# Patient Record
Sex: FEMALE | Race: WHITE | NOT HISPANIC OR LATINO | Employment: FULL TIME | URBAN - METROPOLITAN AREA
[De-identification: names, ages, dates, MRNs, and addresses within clinical notes are randomized per-mention and may not be internally consistent; named-entity substitution may affect disease eponyms.]

---

## 2019-10-02 ENCOUNTER — OFFICE VISIT (OUTPATIENT)
Dept: OBSTETRICS AND GYNECOLOGY | Facility: CLINIC | Age: 59
End: 2019-10-02
Payer: COMMERCIAL

## 2019-10-02 VITALS
DIASTOLIC BLOOD PRESSURE: 100 MMHG | HEIGHT: 68 IN | BODY MASS INDEX: 31.57 KG/M2 | SYSTOLIC BLOOD PRESSURE: 140 MMHG | WEIGHT: 208.31 LBS

## 2019-10-02 DIAGNOSIS — T83.711A: Primary | ICD-10-CM

## 2019-10-02 DIAGNOSIS — N89.8 VAGINAL DISCHARGE: ICD-10-CM

## 2019-10-02 DIAGNOSIS — N76.0 BACTERIAL VAGINOSIS: ICD-10-CM

## 2019-10-02 DIAGNOSIS — N95.2 VAGINAL ATROPHY: ICD-10-CM

## 2019-10-02 DIAGNOSIS — B96.89 BACTERIAL VAGINOSIS: ICD-10-CM

## 2019-10-02 PROCEDURE — 3008F BODY MASS INDEX DOCD: CPT | Mod: CPTII,S$GLB,, | Performed by: OBSTETRICS & GYNECOLOGY

## 2019-10-02 PROCEDURE — 99999 PR PBB SHADOW E&M-NEW PATIENT-LVL III: ICD-10-PCS | Mod: PBBFAC,,, | Performed by: OBSTETRICS & GYNECOLOGY

## 2019-10-02 PROCEDURE — 99204 OFFICE O/P NEW MOD 45 MIN: CPT | Mod: S$GLB,,, | Performed by: OBSTETRICS & GYNECOLOGY

## 2019-10-02 PROCEDURE — 3008F PR BODY MASS INDEX (BMI) DOCUMENTED: ICD-10-PCS | Mod: CPTII,S$GLB,, | Performed by: OBSTETRICS & GYNECOLOGY

## 2019-10-02 PROCEDURE — 99204 PR OFFICE/OUTPT VISIT, NEW, LEVL IV, 45-59 MIN: ICD-10-PCS | Mod: S$GLB,,, | Performed by: OBSTETRICS & GYNECOLOGY

## 2019-10-02 PROCEDURE — 99999 PR PBB SHADOW E&M-NEW PATIENT-LVL III: CPT | Mod: PBBFAC,,, | Performed by: OBSTETRICS & GYNECOLOGY

## 2019-10-02 RX ORDER — IBUPROFEN 200 MG
200 TABLET ORAL EVERY 6 HOURS PRN
Status: ON HOLD | COMMUNITY
End: 2020-03-02 | Stop reason: HOSPADM

## 2019-10-02 RX ORDER — ESTRADIOL 0.1 MG/G
1 CREAM VAGINAL
Qty: 42.5 G | Refills: 1 | Status: SHIPPED | OUTPATIENT
Start: 2019-10-02 | End: 2020-09-08

## 2019-10-02 RX ORDER — METRONIDAZOLE 500 MG/1
500 TABLET ORAL 2 TIMES DAILY
Qty: 14 TABLET | Refills: 0 | Status: SHIPPED | OUTPATIENT
Start: 2019-10-02 | End: 2019-10-09

## 2019-10-02 NOTE — Clinical Note
José Antonio Torres,I hope all is well for you!  This sweet lady has a pretty significant mesh erosion of what appears to be some sort of suburethral sling.  I started her on vaginal estrace for now, but she's gonna need surgery.  She's got complete exposure of the entire strip of mesh on her right side with another area of exposure more medially.  Can you guys get her scheduled to see one of you?  She's also still having urinary incontinence.  I don't think she's got a fistula, but I'm not 100%.

## 2019-10-02 NOTE — PROGRESS NOTES
Subjective:       Patient ID: Brianna Brand is a 59 y.o. female.    Chief Complaint:  Vaginal Discharge      History of Present Illness  HPI  Presents with a chronic bloody vaginal discharge with foul odor.  States this started after she had a hysterectomy/BSO and what sounds like a suburethral mesh sling for urinary incontinence.  She had urinary incontinence prior to the surgery, and it did not resolve at all post-op.  She now has spontaneous urinary leakage.  She also has vaginal pain.  Has a foul smelling bloody vaginal discharge.  She is sexually active with her , but sex is painful at the vaginal opening.  Patient used to smoke, but quit a few years ago.    GYN & OB History  No LMP recorded. Patient has had a hysterectomy.   Date of Last Pap: No result found    OB History    Para Term  AB Living   2 2 2     2   SAB TAB Ectopic Multiple Live Births           2      # Outcome Date GA Lbr Jim/2nd Weight Sex Delivery Anes PTL Lv   2 Term      CS-LTranv   KATIE   1 Term      CS-LTranv   KATIE       Review of Systems  Review of Systems   Constitutional: Negative for fatigue, fever and unexpected weight change.   Gastrointestinal: Negative for abdominal pain, constipation, diarrhea, nausea and vomiting.   Genitourinary: Positive for bladder incontinence, dyspareunia, vaginal bleeding, vaginal discharge, vaginal pain, urinary incontinence and vaginal odor. Negative for dysuria, frequency, urgency and postcoital bleeding.           Objective:    Physical Exam:   Constitutional: She is oriented to person, place, and time. She appears well-developed and well-nourished. No distress.             Abdominal: Soft. She exhibits no distension and no mass. There is no tenderness. There is no rebound and no guarding. Hernia confirmed negative in the right inguinal area and confirmed negative in the left inguinal area.     Genitourinary: Pelvic exam was performed with patient supine. There is no rash,  tenderness, lesion or injury on the right labia. There is no rash, tenderness, lesion or injury on the left labia. Uterus is absent. Right adnexum displays no mass, no tenderness and no fullness. Left adnexum displays no mass, no tenderness and no fullness. No erythema (mucosa pale and thin), tenderness, bleeding, rectocele, cystocele or unspecified prolapse of vaginal walls in the vagina. No foreign body in the vagina. There are signs of injury (complete exposure of suburethral mesh on the right side of the introitus at 11 o'clock with firm granulomatous vaginal tissue with at least 2x2cm vaginal mucosal defect.  Another area of mesh erosion more medially) in the vagina. Vaginal discharge (yellow with fishy odor) found. Cervix exhibits absence.               Neurological: She is alert and oriented to person, place, and time.     Psychiatric: She has a normal mood and affect.        wet prep: many clue cells  Assessment:        1. Erosion of vaginal wall due to surgical mesh, initial encounter    2. Vaginal discharge    3. Bacterial vaginosis    4. Vaginal atrophy                Plan:      Brianna was seen today for vaginal discharge.    Diagnoses and all orders for this visit:    Erosion of vaginal wall due to surgical mesh, initial encounter  -     estradiol (ESTRACE) 0.01 % (0.1 mg/gram) vaginal cream; Place 1 g vaginally 3 (three) times a week.  -     Ambulatory referral to Urogynecology    Vaginal discharge    Bacterial vaginosis  -     metroNIDAZOLE (FLAGYL) 500 MG tablet; Take 1 tablet (500 mg total) by mouth 2 (two) times daily. for 7 days    Vaginal atrophy  -     estradiol (ESTRACE) 0.01 % (0.1 mg/gram) vaginal cream; Place 1 g vaginally 3 (three) times a week.    Reviewed exam findings with the patient.  Given the extent of the erosion and size of the vaginal mucosal defect along with persistent urinary incontinence, will refer to urogynecology.

## 2019-10-07 ENCOUNTER — TELEPHONE (OUTPATIENT)
Dept: UROGYNECOLOGY | Facility: CLINIC | Age: 59
End: 2019-10-07

## 2019-10-07 NOTE — TELEPHONE ENCOUNTER
[facility],    Can you please schedule pt for consult with Dr. Perea for mesh erosion, referred by Dr. Laurel Candelaria.    Thanks,  Raad

## 2019-10-07 NOTE — TELEPHONE ENCOUNTER
----- Message from Brian Perea MD sent at 10/6/2019  1:22 PM CDT -----  Regarding: please get patient scheduled for appt  Please get patient scheduled to see me in near future.  Consult from Dr. Laurel Candelaria. Thanks!  ----- Message -----  From: Laurel Candelaria MD  Sent: 10/2/2019   9:10 AM CDT  To: MD José Antonio Cisneros,  I hope all is well for you!  This sweet lady has a pretty significant mesh erosion of what appears to be some sort of suburethral sling.  I started her on vaginal estrace for now, but she's gonna need surgery.  She's got complete exposure of the entire strip of mesh on her right side with another area of exposure more medially.  Can you guys get her scheduled to see one of you?  She's also still having urinary incontinence.  I don't think she's got a fistula, but I'm not 100%.

## 2019-10-08 ENCOUNTER — TELEPHONE (OUTPATIENT)
Dept: UROGYNECOLOGY | Facility: CLINIC | Age: 59
End: 2019-10-08

## 2019-10-08 NOTE — TELEPHONE ENCOUNTER
----- Message from Brian Perea MD sent at 10/7/2019  7:31 PM CDT -----  Can we get her in sooner than 1/9/2020?  If can't find spot, let me know, and we can make something work. Thanks!  ----- Message -----  From: Laurel Candelaria MD  Sent: 10/2/2019   9:10 AM CDT  To: MD José Antonio Cisnerso,  I hope all is well for you!  This sweet lady has a pretty significant mesh erosion of what appears to be some sort of suburethral sling.  I started her on vaginal estrace for now, but she's gonna need surgery.  She's got complete exposure of the entire strip of mesh on her right side with another area of exposure more medially.  Can you guys get her scheduled to see one of you?  She's also still having urinary incontinence.  I don't think she's got a fistula, but I'm not 100%.

## 2019-10-10 ENCOUNTER — TELEPHONE (OUTPATIENT)
Dept: UROGYNECOLOGY | Facility: CLINIC | Age: 59
End: 2019-10-10

## 2019-10-10 NOTE — TELEPHONE ENCOUNTER
----- Message from Maria Esther Figueroa sent at 10/10/2019 10:07 AM CDT -----  Contact: GLENDY MCCLOUD [[id number]]  Type:  Patient Returning Call    Who Called: GLENDY MCCLOUD [[id number]]    Who Left Message for Patient: Raad     Does the patient know what this is regarding?:no     Best Call Back Number:[phone]     Additional Information:

## 2019-10-10 NOTE — TELEPHONE ENCOUNTER
Spoke to pt, she was offered a sooner appointment on 10/17/2019, pt declined the appointment stating she rather keep her scheduled appointment 1/2020 because of insurance purposes.

## 2020-01-09 ENCOUNTER — INITIAL CONSULT (OUTPATIENT)
Dept: UROGYNECOLOGY | Facility: CLINIC | Age: 60
End: 2020-01-09
Payer: COMMERCIAL

## 2020-01-09 VITALS
BODY MASS INDEX: 31.57 KG/M2 | HEIGHT: 68 IN | DIASTOLIC BLOOD PRESSURE: 78 MMHG | WEIGHT: 208.31 LBS | SYSTOLIC BLOOD PRESSURE: 118 MMHG

## 2020-01-09 DIAGNOSIS — N39.46 MIXED STRESS AND URGE URINARY INCONTINENCE: ICD-10-CM

## 2020-01-09 DIAGNOSIS — N95.2 VAGINAL ATROPHY: ICD-10-CM

## 2020-01-09 DIAGNOSIS — T83.711A: ICD-10-CM

## 2020-01-09 DIAGNOSIS — R30.0 DYSURIA: Primary | ICD-10-CM

## 2020-01-09 PROCEDURE — 51701 PR INSERTION OF NON-INDWELLING BLADDER CATHETERIZATION FOR RESIDUAL UR: ICD-10-PCS | Mod: S$GLB,,, | Performed by: OBSTETRICS & GYNECOLOGY

## 2020-01-09 PROCEDURE — 99999 PR PBB SHADOW E&M-EST. PATIENT-LVL III: CPT | Mod: PBBFAC,,, | Performed by: OBSTETRICS & GYNECOLOGY

## 2020-01-09 PROCEDURE — 51701 INSERT BLADDER CATHETER: CPT | Mod: S$GLB,,, | Performed by: OBSTETRICS & GYNECOLOGY

## 2020-01-09 PROCEDURE — 99999 PR PBB SHADOW E&M-EST. PATIENT-LVL III: ICD-10-PCS | Mod: PBBFAC,,, | Performed by: OBSTETRICS & GYNECOLOGY

## 2020-01-09 PROCEDURE — 87086 URINE CULTURE/COLONY COUNT: CPT

## 2020-01-09 PROCEDURE — 99244 OFF/OP CNSLTJ NEW/EST MOD 40: CPT | Mod: 25,S$GLB,, | Performed by: OBSTETRICS & GYNECOLOGY

## 2020-01-09 PROCEDURE — 99244 PR OFFICE CONSULTATION,LEVEL IV: ICD-10-PCS | Mod: 25,S$GLB,, | Performed by: OBSTETRICS & GYNECOLOGY

## 2020-01-09 NOTE — PROGRESS NOTES
[facility]   [address] [address]    Brianna Brand  [id number]  1960 January 13, 2020    Consulting Physician: Laurel Candelaria MD   GYN: MD Teagan  Primary M.D.: ANETTE White    Chief Complaint   Patient presents with    Consult     mesh erosion     HPI:     1)  Exposure of vaginal mesh:  --s/p GILDA/BSO (menorrhagia) + [facility])   --had MUS for MAXWELL   --MAXWELL improved but did not resolve s/p MUS; pad use decreased from many to 2 PPD with variable wetness (more with cough/sneeze--can feel small gush but less than before MUS); also having some discharge so difficult tell what's on pad   --UUI: occasional    --Daytime frequency: Q 2 hours.  Nocturia: Yes: 1/night.   (--) dysuria,  (--) hematuria,  (--) frequent UTIs.  (+) complete bladder emptying.   --still having some pinkish, malodorous discharge   --sexually active: not major issues since starting estrogen; partner did tell her he felt something rough internally in the past   --no s/sx POP   --no major constipation/diarrhea when eats properly; no hematochezia/FI     Per Dr. Ahuja 10/2019:  Presents with a chronic bloody vaginal discharge with foul odor.  States this started after she had a hysterectomy/BSO and what sounds like a suburethral mesh sling for urinary incontinence.  She had urinary incontinence prior to the surgery, and it did not resolve at all post-op.  She now has spontaneous urinary leakage.  She also has vaginal pain.  Has a foul smelling bloody vaginal discharge.  She is sexually active with her , but sex is painful at the vaginal opening.  Patient used to smoke, but quit a few years ago  EXAM:  Uterus is absent. Right adnexum displays no mass, no tenderness and no fullness. Left adnexum displays no mass, no tenderness and no fullness. No erythema (mucosa pale and thin), tenderness, bleeding, rectocele, cystocele or unspecified prolapse of  vaginal walls in the vagina. No foreign body in the vagina. There are signs of injury (complete exposure of suburethral mesh on the right side of the introitus at 11 o'clock with firm granulomatous vaginal tissue with at least 2x2cm vaginal mucosal defect.  Another area of mesh erosion more medially) in the vagina. Vaginal discharge (yellow with fishy odor) found. Cervix exhibits absence.               --s/p PO flagyl  --using vaginal estrogen    Past Medical History  History reviewed. No pertinent past medical history.     Past Surgical History  Past Surgical History:   Procedure Laterality Date     SECTION      x2    HYSTERECTOMY      OOPHORECTOMY      vaginal mesh      WISDOM TOOTH EXTRACTION        Hysterectomy: Yes   --s/p GILDA/BSO (menorrhagia) + [facility] [place] ([facility])    Past Ob History     x 0.  C/s x 2 (1st for malpresentation).    Largest infant weight: 7#7oz.     Gynecologic History  LMP: No LMP recorded. Patient has had a hysterectomy.  Age of menarche: 13 yo  Age of menopause: with GILDA  Menstrual history: h/o menorrhagia  Pap test: post GILDA.  History of abnormal paps: No.  History of STIs:  Yes - reports had 1 in college--treated  Mammogram: Date of last: 1/10/19 ([facility]).  Result: Normal  Colonoscopy: none.  Doesn't want one.    DEXA: none    Family History  Family History   Problem Relation Age of Onset    Hypertension Father     Heart attack Father     Breast cancer Neg Hx     Colon cancer Neg Hx     Diabetes Neg Hx     Ovarian cancer Neg Hx       Colon CA: No  Breast CA: No  GYN CA: No   CA: No    Social History  Social History     Tobacco Use   Smoking Status Former Smoker   Smokeless Tobacco Never Used     Cessation date: 2019.  PPD:  1 x 30+ years.   Social History     Substance and Sexual Activity   Alcohol Use Yes    Frequency: 2-4 times a month    Comment: socially   .    Social History     Substance and Sexual Activity   Drug Use Never     The  "patient is .  Resides in [place] [address].  Employment status: currently employed as dental technician.     Allergies  Review of patient's allergies indicates:  No Known Allergies    Medications  Current Outpatient Medications on File Prior to Visit   Medication Sig Dispense Refill    estradiol (ESTRACE) 0.01 % (0.1 mg/gram) vaginal cream Place 1 g vaginally 3 (three) times a week. 42.5 g 1    ibuprofen (ADVIL,MOTRIN) 200 MG tablet Take 200 mg by mouth every 6 (six) hours as needed for Pain.      FLUCELVAX QUAD 9826-7878, PF, 60 mcg (15 mcg x 4)/0.5 mL Syrg        No current facility-administered medications on file prior to visit.        Review of Systems A 14 point ROS was reviewed with pertinent positives as noted above in the history of present illness.      Constitutional: negative  Eyes: negative  Endocrine: negative  Gastrointestinal: negative  Cardiovascular: negative  Respiratory: negative  Allergic/Immunologic: negative  Integumentary: negative  Psychiatric: negative  Musculoskeletal: negative   Ear/Nose/Throat: negative  Neurologic: negative  Genitourinary: SEE HPI  Hematologic/Lymphatic: negative   Breast: negative    Urogynecologic Exam  /78 (BP Location: Right arm, Patient Position: Sitting, BP Method: Large (Manual))   Ht 5' 7.5" (1.715 m)   Wt 94.5 kg (208 lb 5.4 oz)   BMI 32.15 kg/m²     GENERAL APPEARANCE:  The patient is well-developed, well-nourished.  Neck:  Supple with no thyromegaly, no carotid bruits.  Heart:  Regular rate and rhythm, no murmurs, rubs or gallops.  Lungs:  Clear.  No CVA tenderness.  Abdomen:  Soft, nontender, nondistended, no hepatosplenomegaly.  Incisions:  Pfannenstiel well-healed    PELVIC:    External genitalia:  Normal Bartholins, Skenes and labia bilaterally.    Urethra:  No caruncle, diverticulum or masses.  (+) hypermobility.  2 cm area of mesh exposure at right periurethral sulcus with mutiple, large adherent stones, NT. Small stone at mid, " posterior urethra.  Left urethra NT, no mesh/other abnormalities palpated.            Vagina:  Atrophy (+) , no bladder masses or tender, no discharge.    Cervix:  absent  Uterus: uterus absent  Adnexa: Not palpable.    POP-Q:   Deferred.  No obvious POP present with valsalva.     NEUROLOGIC:  Cranial nerves 2 through 12 intact.  Strength 5/5.  DTRs 2+ lower extremities.  S2 through 4 normal.  Sacral reflexes intact.    EXT: MARTINEZ, 2+ pulses bilaterally, no C/C/E    COUGH STRESS TEST:  negative  KEGEL: 1 /5    RECTAL:    External:  Normal, (--) hemorrhoids, (--) dovetailing.   Internal:  deferred    PVR: 20 mL    Impression    1. Dysuria    2. Erosion of vaginal wall due to surgical mesh, initial encounter    3. Vaginal atrophy    4. Mixed stress and urge urinary incontinence        Initial Plan  The patient was counseled regarding these issues. The patient was given a summary sheet containing each of these issues with possible options for evaluation and management. When appropriate, we also reviewed computer-generated diagrams specific to their diagnoses..  All questions were addressed to the patient's satisfaction.     1)  Vaginal mesh exposure:  --exam consistent with mesh exposure along right sing path with adherent stones  --patient not having pain but having significant, malodorous discharge  --plan mesh/stone excision:   --get op notes: release signed   --consider need to release R arm/midline only vs B sides; will assess intraop; patient aware that UI, ita MAXWELL, could worsen postop and require future Tx   --schedule cystoscopy/urodynamics for baseline, ita since patient states MAXWELL still present   --discussed fact that initial sling was placed while still smoking (major RF for poor mesh healing) and vaginal atrophy   --needs preop clearance from PCP ([facility]) + preop labs CBC, BMP, EKG, T&S    2)  Vaginal atrophy:  Continue using vaginal estrogen.     3)  Mixed urinary incontinence, stress > urge:  --urine  C&S  --Empty bladder every 3 hours.  Empty well: wait a minute, lean forward on toilet.    --Avoid dietary irritants (see sheet).  Keep diary x 3-5 days to determine your irritants.  --KEGELS: do 10 in AM and 10 in PM, holding each x 10 seconds.  When you feel urge to go, STOP, KEGEL, and when urge has passed, then go to bathroom.  Consider PT in future.    --URGE: consider medication in the future.  Takes 2-4 weeks to see if will have effect.  For dry mouth: get sour, sugar free lozenge or gum.    --STRESS:  Pessary vs. Sling.   --if mesh is removed, patient understands  that UI, ita MAXWELL, could worsen postop and require future Tx    4)  Will call you to discuss OR dates, setting up preop UDS/cysto/PCP visit/labs.      Approximately 50 min were spent in consult, 90 % in discussion.     Thank you for requesting consultation of your patient.  I look forward to participating in their care.    Brian Perea  Female Pelvic Medicine and Reconstructive Surgery  [facility] [place], [place]

## 2020-01-09 NOTE — LETTER
January 13, 2020      Laurel Candelaria MD  [id number] [place] [address] [address] [place] [address]           [facility]   [address] [place] [address]  Phone: [phone]          Patient: Brianna Brand   MR Number: [id number]   YOB: 1960   Date of Visit: 1/9/2020       Dear Dr. Laurel Candelaria:    Thank you for referring Brianna Brand to me for evaluation. Attached you will find relevant portions of my assessment and plan of care.    If you have questions, please do not hesitate to call me. I look forward to following Brianna Brand along with you.    Sincerely,    Brian Perea MD    Enclosure  CC:  No Recipients    If you would like to receive this communication electronically, please contact [email or web address] or [phone] to request more information on [facility] access.    For providers and/or their staff who would like to refer a patient to [facility], please contact us through our one-stop-shop provider referral line, [facility] , at [phone].    If you feel you have received this communication in error or would no longer like to receive these types of communications, please e-mail [email or web address]

## 2020-01-11 LAB — BACTERIA UR CULT: NO GROWTH

## 2020-01-13 PROBLEM — N39.46 MIXED STRESS AND URGE URINARY INCONTINENCE: Status: ACTIVE | Noted: 2020-01-13

## 2020-01-14 ENCOUNTER — TELEPHONE (OUTPATIENT)
Dept: UROGYNECOLOGY | Facility: CLINIC | Age: 60
End: 2020-01-14

## 2020-01-14 NOTE — TELEPHONE ENCOUNTER
----- Message from Brian Perea MD sent at 1/13/2020  9:46 PM CST -----  Regarding: did we send request for release of information for op note?  Hi:  I signed release of information 1/9/2020 to get a copy of the op note for the sling placed for this patient.  Were we able to fax that request?  Thanks!

## 2020-01-20 ENCOUNTER — TELEPHONE (OUTPATIENT)
Dept: UROGYNECOLOGY | Facility: CLINIC | Age: 60
End: 2020-01-20

## 2020-01-20 NOTE — TELEPHONE ENCOUNTER
----- Message from Charles Parada sent at 1/20/2020  8:18 AM CST -----  Contact: Pt   Name of Who is Calling: GLENDY MCCLOUD [[id number]]    What is the request in detail: Pt is requesting a call back regards to paperwork......Please contact to further discuss and advise      Can the clinic reply by [facility]: Yes     What Number to Call Back if not in [facility]: [phone]

## 2020-01-21 ENCOUNTER — TELEPHONE (OUTPATIENT)
Dept: UROGYNECOLOGY | Facility: CLINIC | Age: 60
End: 2020-01-21

## 2020-01-21 DIAGNOSIS — T83.711A: Primary | ICD-10-CM

## 2020-01-21 DIAGNOSIS — N39.46 MIXED STRESS AND URGE URINARY INCONTINENCE: ICD-10-CM

## 2020-02-05 ENCOUNTER — TELEPHONE (OUTPATIENT)
Dept: UROGYNECOLOGY | Facility: CLINIC | Age: 60
End: 2020-02-05

## 2020-02-05 NOTE — TELEPHONE ENCOUNTER
----- Message from Roberta Thomas sent at 2/5/2020 11:58 AM CST -----  Contact: Peggy with [facility]  Name of Who is Calling: [facility]    What is the request in detail: states they are needing the patient's pre op form so they can be filled out and patient can be cleared. Please fax forms to:[phone].  Please contact to further discuss and advise      Can the clinic reply by [facility]: no    What Number to Call Back [facility] not [facility]: [phone]

## 2020-02-06 ENCOUNTER — TELEPHONE (OUTPATIENT)
Dept: UROGYNECOLOGY | Facility: CLINIC | Age: 60
End: 2020-02-06

## 2020-02-06 NOTE — TELEPHONE ENCOUNTER
----- Message from Amanda Angel sent at 2/6/2020  3:49 PM CST -----  Contact: [facility]      Name of Who is Calling: [facility]      What is the request in detail: [facility] needs pre-op packet to get cleared for surgery.Please contact to further discuss and advise.        Can the clinic reply by [facility]:  [facility] [phone] Fax [phone]      What Number to Call Back if not in [facility]:

## 2020-02-10 DIAGNOSIS — T83.711A: Primary | ICD-10-CM

## 2020-02-19 ENCOUNTER — HOSPITAL ENCOUNTER (OUTPATIENT)
Dept: PREADMISSION TESTING | Facility: OTHER | Age: 60
Discharge: HOME | End: 2020-02-19
Attending: OBSTETRICS & GYNECOLOGY
Payer: COMMERCIAL

## 2020-02-19 ENCOUNTER — PROCEDURE VISIT (OUTPATIENT)
Dept: UROGYNECOLOGY | Facility: CLINIC | Age: 60
End: 2020-02-19
Payer: COMMERCIAL

## 2020-02-19 ENCOUNTER — ANESTHESIA EVENT (OUTPATIENT)
Dept: SURGERY | Facility: OTHER | Age: 60
End: 2020-02-19
Payer: COMMERCIAL

## 2020-02-19 ENCOUNTER — OFFICE VISIT (OUTPATIENT)
Dept: UROGYNECOLOGY | Facility: CLINIC | Age: 60
End: 2020-02-19
Payer: COMMERCIAL

## 2020-02-19 VITALS
HEART RATE: 97 BPM | BODY MASS INDEX: 32.65 KG/M2 | WEIGHT: 208 LBS | DIASTOLIC BLOOD PRESSURE: 72 MMHG | SYSTOLIC BLOOD PRESSURE: 131 MMHG | OXYGEN SATURATION: 99 % | TEMPERATURE: 98 F | HEIGHT: 67 IN

## 2020-02-19 VITALS
SYSTOLIC BLOOD PRESSURE: 110 MMHG | DIASTOLIC BLOOD PRESSURE: 60 MMHG | HEIGHT: 67 IN | BODY MASS INDEX: 32.63 KG/M2 | WEIGHT: 207.88 LBS

## 2020-02-19 VITALS
HEIGHT: 67 IN | DIASTOLIC BLOOD PRESSURE: 86 MMHG | WEIGHT: 208 LBS | SYSTOLIC BLOOD PRESSURE: 126 MMHG | BODY MASS INDEX: 32.65 KG/M2

## 2020-02-19 DIAGNOSIS — N39.46 MIXED STRESS AND URGE URINARY INCONTINENCE: ICD-10-CM

## 2020-02-19 DIAGNOSIS — N95.2 VAGINAL ATROPHY: ICD-10-CM

## 2020-02-19 DIAGNOSIS — Z01.818 PREOP TESTING: Primary | ICD-10-CM

## 2020-02-19 DIAGNOSIS — T83.711A: ICD-10-CM

## 2020-02-19 DIAGNOSIS — Z01.818 PREOPERATIVE EXAM FOR GYNECOLOGIC SURGERY: Primary | ICD-10-CM

## 2020-02-19 LAB
ABO + RH BLD: NORMAL
BASOPHILS # BLD AUTO: 0.05 K/UL (ref 0–0.2)
BASOPHILS NFR BLD: 0.8 % (ref 0–1.9)
BILIRUB SERPL-MCNC: NORMAL MG/DL
BLD GP AB SCN CELLS X3 SERPL QL: NORMAL
BLOOD URINE, POC: >50
COLOR, POC UA: NORMAL
DIFFERENTIAL METHOD: ABNORMAL
EOSINOPHIL # BLD AUTO: 0.2 K/UL (ref 0–0.5)
EOSINOPHIL NFR BLD: 2.4 % (ref 0–8)
ERYTHROCYTE [DISTWIDTH] IN BLOOD BY AUTOMATED COUNT: 13.3 % (ref 11.5–14.5)
GLUCOSE UR QL STRIP: NORMAL
HCT VFR BLD AUTO: 43.5 % (ref 37–48.5)
HGB BLD-MCNC: 13.6 G/DL (ref 12–16)
IMM GRANULOCYTES # BLD AUTO: 0.02 K/UL (ref 0–0.04)
IMM GRANULOCYTES NFR BLD AUTO: 0.3 % (ref 0–0.5)
KETONES UR QL STRIP: NORMAL
LEUKOCYTE ESTERASE URINE, POC: NORMAL
LYMPHOCYTES # BLD AUTO: 2 K/UL (ref 1–4.8)
LYMPHOCYTES NFR BLD: 32 % (ref 18–48)
MCH RBC QN AUTO: 30.4 PG (ref 27–31)
MCHC RBC AUTO-ENTMCNC: 31.3 G/DL (ref 32–36)
MCV RBC AUTO: 97 FL (ref 82–98)
MONOCYTES # BLD AUTO: 0.5 K/UL (ref 0.3–1)
MONOCYTES NFR BLD: 7.1 % (ref 4–15)
NEUTROPHILS # BLD AUTO: 3.7 K/UL (ref 1.8–7.7)
NEUTROPHILS NFR BLD: 57.4 % (ref 38–73)
NITRITE, POC UA: NORMAL
NRBC BLD-RTO: 0 /100 WBC
PH, POC UA: 6
PLATELET # BLD AUTO: 331 K/UL (ref 150–350)
PMV BLD AUTO: 9.1 FL (ref 9.2–12.9)
PROTEIN, POC: NORMAL
RBC # BLD AUTO: 4.47 M/UL (ref 4–5.4)
SPECIFIC GRAVITY, POC UA: 1.01
UROBILINOGEN, POC UA: NORMAL
WBC # BLD AUTO: 6.37 K/UL (ref 3.9–12.7)

## 2020-02-19 PROCEDURE — 81002 URINALYSIS NONAUTO W/O SCOPE: CPT | Mod: S$GLB,,, | Performed by: NURSE PRACTITIONER

## 2020-02-19 PROCEDURE — 51797 INTRAABDOMINAL PRESSURE TEST: CPT | Mod: S$GLB,,, | Performed by: OBSTETRICS & GYNECOLOGY

## 2020-02-19 PROCEDURE — 51728 CYSTOMETROGRAM W/VP: CPT | Mod: S$GLB,,, | Performed by: OBSTETRICS & GYNECOLOGY

## 2020-02-19 PROCEDURE — 52000 CYSTOURETHROSCOPY: CPT | Mod: 59,S$GLB,, | Performed by: OBSTETRICS & GYNECOLOGY

## 2020-02-19 PROCEDURE — 81002 POCT URINE DIPSTICK WITHOUT MICROSCOPE: ICD-10-PCS | Mod: S$GLB,,, | Performed by: NURSE PRACTITIONER

## 2020-02-19 PROCEDURE — 51784 ANAL/URINARY MUSCLE STUDY: CPT | Mod: 51,S$GLB,, | Performed by: OBSTETRICS & GYNECOLOGY

## 2020-02-19 PROCEDURE — 85025 COMPLETE CBC W/AUTO DIFF WBC: CPT

## 2020-02-19 PROCEDURE — 99999 PR PBB SHADOW E&M-EST. PATIENT-LVL III: CPT | Mod: PBBFAC,,, | Performed by: NURSE PRACTITIONER

## 2020-02-19 PROCEDURE — 99499 NO LOS: ICD-10-PCS | Mod: S$GLB,,, | Performed by: NURSE PRACTITIONER

## 2020-02-19 PROCEDURE — 99999 PR PBB SHADOW E&M-EST. PATIENT-LVL III: ICD-10-PCS | Mod: PBBFAC,,, | Performed by: NURSE PRACTITIONER

## 2020-02-19 PROCEDURE — 86850 RBC ANTIBODY SCREEN: CPT

## 2020-02-19 PROCEDURE — 52000 PR CYSTOURETHROSCOPY: ICD-10-PCS | Mod: 59,S$GLB,, | Performed by: OBSTETRICS & GYNECOLOGY

## 2020-02-19 PROCEDURE — 51741 ELECTRO-UROFLOWMETRY FIRST: CPT | Mod: 51,S$GLB,, | Performed by: OBSTETRICS & GYNECOLOGY

## 2020-02-19 PROCEDURE — 51784 PR ANAL/URINARY MUSCLE STUDY: ICD-10-PCS | Mod: 51,S$GLB,, | Performed by: OBSTETRICS & GYNECOLOGY

## 2020-02-19 PROCEDURE — 51741 PR UROFLOWMETRY, COMPLEX: ICD-10-PCS | Mod: 51,S$GLB,, | Performed by: OBSTETRICS & GYNECOLOGY

## 2020-02-19 PROCEDURE — 99499 UNLISTED E&M SERVICE: CPT | Mod: S$GLB,,, | Performed by: NURSE PRACTITIONER

## 2020-02-19 PROCEDURE — 36415 COLL VENOUS BLD VENIPUNCTURE: CPT

## 2020-02-19 PROCEDURE — 51728 PR COMPLEX CYSTOMETROGRAM VOIDING PRESSURE STUDIES: ICD-10-PCS | Mod: S$GLB,,, | Performed by: OBSTETRICS & GYNECOLOGY

## 2020-02-19 PROCEDURE — 51797 PR VOIDING PRESS STUDY INTRA-ABDOMINAL VOID: ICD-10-PCS | Mod: S$GLB,,, | Performed by: OBSTETRICS & GYNECOLOGY

## 2020-02-19 RX ORDER — ALBUTEROL SULFATE 0.83 MG/ML
2.5 SOLUTION RESPIRATORY (INHALATION)
Status: CANCELLED | OUTPATIENT
Start: 2020-02-19 | End: 2020-02-19

## 2020-02-19 RX ORDER — CIPROFLOXACIN 500 MG/1
500 TABLET ORAL
Status: COMPLETED | OUTPATIENT
Start: 2020-02-19 | End: 2020-02-19

## 2020-02-19 RX ORDER — ACETAMINOPHEN 500 MG
1000 TABLET ORAL
Status: CANCELLED | OUTPATIENT
Start: 2020-02-19 | End: 2020-02-19

## 2020-02-19 RX ORDER — LIDOCAINE HYDROCHLORIDE 20 MG/ML
JELLY TOPICAL ONCE
Status: COMPLETED | OUTPATIENT
Start: 2020-02-19 | End: 2020-02-19

## 2020-02-19 RX ORDER — PREGABALIN 50 MG/1
50 CAPSULE ORAL
Status: CANCELLED | OUTPATIENT
Start: 2020-02-19 | End: 2020-02-19

## 2020-02-19 RX ORDER — SODIUM CHLORIDE, SODIUM LACTATE, POTASSIUM CHLORIDE, CALCIUM CHLORIDE 600; 310; 30; 20 MG/100ML; MG/100ML; MG/100ML; MG/100ML
INJECTION, SOLUTION INTRAVENOUS CONTINUOUS
Status: CANCELLED | OUTPATIENT
Start: 2020-02-19

## 2020-02-19 RX ORDER — CELECOXIB 200 MG/1
400 CAPSULE ORAL
Status: CANCELLED | OUTPATIENT
Start: 2020-02-19 | End: 2020-02-19

## 2020-02-19 RX ADMIN — CIPROFLOXACIN 500 MG: 500 TABLET ORAL at 03:02

## 2020-02-19 RX ADMIN — LIDOCAINE HYDROCHLORIDE 5 ML: 20 JELLY TOPICAL at 03:02

## 2020-02-19 NOTE — ANESTHESIA PREPROCEDURE EVALUATION
02/19/2020  Brianna Brand is a 59 y.o., female.    Anesthesia Evaluation    I have reviewed the Patient Summary Reports.    I have reviewed the Nursing Notes.   I have reviewed the Medications.     Review of Systems  Anesthesia Hx:  No problems with previous Anesthesia  Denies Family Hx of Anesthesia complications.   Denies Personal Hx of Anesthesia complications.   Social:  Former Smoker Quit 1/1/19. Prior 1 ppd   Hematology/Oncology:  Hematology Normal   Oncology Normal     EENT/Dental:EENT/Dental Normal   Cardiovascular:  Cardiovascular Normal Exercise tolerance: good     Pulmonary:  Pulmonary Normal    Renal/:  Renal/ Normal     Musculoskeletal:  Musculoskeletal Normal    Neurological:  Neurology Normal    Endocrine:  Endocrine Normal    Dermatological:  Skin Normal    Psych:  Psychiatric Normal           Physical Exam  General:  Obesity    Airway/Jaw/Neck:  Airway Findings: Mouth Opening: Normal Tongue: Normal  General Airway Assessment: Adult  Mallampati: I  TM Distance: Normal, at least 6 cm  Jaw/Neck Findings:  Neck ROM: Normal ROM      Dental:  Dental Findings: In tact             Anesthesia Plan  Type of Anesthesia, risks & benefits discussed:  Anesthesia Type:  general  Patient's Preference:   Intra-op Monitoring Plan:   Intra-op Monitoring Plan Comments:   Post Op Pain Control Plan: multimodal analgesia  Post Op Pain Control Plan Comments:   Induction:   IV  Beta Blocker:         Informed Consent: Patient understands risks and agrees with Anesthesia plan.  Questions answered. Anesthesia consent signed with patient.  ASA Score: 2     Day of Surgery Review of History & Physical:    H&P update referred to the surgeon.         Ready For Surgery From Anesthesia Perspective.

## 2020-02-19 NOTE — DISCHARGE INSTRUCTIONS
Information to Prepare you for your Surgery    PRE-ADMIT TESTING -  [phone]    [address]  [facility]          Your surgery has been scheduled at [facility]. We are pleased to have the opportunity to serve you. For Further Information please call [phone].    On the day of surgery please report to the Information Desk on the 1st floor.    · CONTACT YOUR PHYSICIAN'S OFFICE THE DAY PRIOR TO YOUR SURGERY TO OBTAIN YOUR ARRIVAL TIME.     · The evening before surgery do not eat anything after 9 p.m. ( this includes hard candy, chewing gum and mints).  You may only have GATORADE, POWERADE AND WATER  from 9 p.m. until you leave your home.   DO NOT DRINK ANY LIQUIDS ON THE WAY TO THE HOSPITAL.      SPECIAL MEDICATION INSTRUCTIONS: TAKE medications checked off by the Anesthesiologist on your Medication List.    Angiogram Patients: Take medications as instructed by your physician, including aspirin.     Surgery Patients:    If you take ASPIRIN - Your PHYSICIAN/SURGEON will need to inform you IF/OR when you need to stop taking aspirin prior to your surgery.     Do Not take any medications containing IBUPROFEN.  Do Not Wear any make-up or dark nail polish   (especially eye make-up) to surgery. If you come to surgery with makeup on you will be required to remove the makeup or nail polish.    Do not shave your surgical area at least 5 days prior to your surgery. The surgical prep will be performed at the hospital according to Infection Control regulations.    Leave all valuables at home.   Do Not wear any jewelry or watches, including any metal in body piercings. Jewelry must be removed prior to coming to the hospital.  There is a possibility that rings that are unable to be removed may be cut off if they are on the surgical extremity.    Contact Lens must be removed before surgery. Either do not wear the contact lens or bring a case and solution for  storage.  Please bring a container for eyeglasses or dentures as required.  Bring any paperwork your physician has provided, such as consent forms,  history and physicals, doctor's orders, etc.   Bring comfortable clothes that are loose fitting to wear upon discharge. Take into consideration the type of surgery being performed.  Maintain your diet as advised per your physician the day prior to surgery.      Adequate rest the night before surgery is advised.   Park in the Parking lot behind the hospital or in the [facility] across the street from the parking lot. Parking is complimentary.  If you will be discharged the same day as your procedure, please arrange for a responsible adult to drive you home or to accompany you if traveling by taxi.   YOU WILL NOT BE PERMITTED TO DRIVE OR TO LEAVE THE HOSPITAL ALONE AFTER SURGERY.   It is strongly recommended that you arrange for someone to remain with you for the first 24 hrs following your surgery.    The Surgeon will speak to your family/visitor after your surgery regarding the outcome of your surgery and post op care.  The Surgeon may speak to you after your surgery, but there is a possibility you may not remember the details.  Please check with your family members regarding the conversation with the Surgeon.    We strongly recommend whoever is bringing you home be present for discharge instructions.  This will ensure a thorough understanding for your post op home care.    EACH PATIENT IS ALLOWED TWO FAMILY MEMBERS OR VISITORS IN THE ROOM AND IN THE WAITING ROOMS WHILE YOU ARE IN SURGERY. ALL CHILDREN MUST ALWAYS BE ACCOMPANIED BY AN ADULT.    Thank you for your cooperation.  The Staff of [facility].                Bathing Instructions with Hibiclens     Shower the evening before and morning of your procedure with Hibiclens:   Wash your face with water and your regular face wash/soap   Apply Hibiclens directly on your skin or on a  wet washcloth and wash gently. When showering: Move away from the shower stream when applying Hibiclens to avoid rinsing off too soon.   Rinse thoroughly with warm water   Do not dilute Hibiclens         Dry off as usual, do not use any deodorant, powder, body lotions, perfume, after shave or cologne.

## 2020-02-20 NOTE — PROGRESS NOTES
Urogyn follow up  02/19/2020  .  [facility]   [address] [address]    Brianna Brand  [id number]  1960      Brianna Brand is a 59 y.o.  here for a urogyn preop visit.    Last HPI from 01/09/2020  1)  Exposure of vaginal mesh:  --s/p GILDA/BSO (menorrhagia) + [facility]              --had MUS for MAXWELL              --MAXWELL improved but did not resolve s/p MUS; pad use decreased from many to 2 PPD with variable wetness (more with cough/sneeze--can feel small gush but less than before MUS); also having some discharge so difficult tell what's on pad              --UUI: occasional               --Daytime frequency: Q 2 hours.  Nocturia: Yes: 1/night.   (--) dysuria,  (--) hematuria,  (--) frequent UTIs.  (+) complete bladder emptying.              --still having some pinkish, malodorous discharge              --sexually active: not major issues since starting estrogen; partner did tell her he felt something rough internally in the past              --no s/sx POP              --no major constipation/diarrhea when eats properly; no hematochezia/FI                Per Dr. Ahuja 10/2019:  Presents with a chronic bloody vaginal discharge with foul odor.  States this started after she had a hysterectomy/BSO and what sounds like a suburethral mesh sling for urinary incontinence.  She had urinary incontinence prior to the surgery, and it did not resolve at all post-op.  She now has spontaneous urinary leakage.  She also has vaginal pain.  Has a foul smelling bloody vaginal discharge.  She is sexually active with her , but sex is painful at the vaginal opening.  Patient used to smoke, but quit a few years ago  EXAM:  Uterus is absent. Right adnexum displays no mass, no tenderness and no fullness. Left adnexum displays no mass, no tenderness and no fullness. No erythema (mucosa pale and  thin), tenderness, bleeding, rectocele, cystocele or unspecified prolapse of vaginal walls in the vagina. No foreign body in the vagina. There are signs of injury (complete exposure of suburethral mesh on the right side of the introitus at 11 o'clock with firm granulomatous vaginal tissue with at least 2x2cm vaginal mucosal defect.  Another area of mesh erosion more medially) in the vagina. Vaginal discharge (yellow with fishy odor) found. Cervix exhibits absence.               --s/p PO flagyl  --using vaginal estrogen    Suds  +MAXWELL with cough and valsalva starting at 300 mL    History reviewed. No pertinent past medical history.    Past Surgical History:   Procedure Laterality Date     SECTION      x2    HYSTERECTOMY      OOPHORECTOMY      vaginal mesh      WISDOM TOOTH EXTRACTION         Family History   Problem Relation Age of Onset    Hypertension Father     Heart attack Father     Breast cancer Neg Hx     Colon cancer Neg Hx     Diabetes Neg Hx     Ovarian cancer Neg Hx        Social History     Socioeconomic History    Marital status:      Spouse name: Not on file    Number of children: Not on file    Years of education: Not on file    Highest education level: Not on file   Occupational History    Not on file   Social Needs    Financial resource strain: Not on file    Food insecurity:     Worry: Not on file     Inability: Not on file    Transportation needs:     Medical: Not on file     Non-medical: Not on file   Tobacco Use    Smoking status: Former Smoker     Last attempt to quit: 2019     Years since quittin.1    Smokeless tobacco: Never Used   Substance and Sexual Activity    Alcohol use: Yes     Frequency: 2-4 times a month     Comment: socially    Drug use: Never    Sexual activity: Yes     Partners: Male     Birth control/protection: See Surgical Hx   Lifestyle    Physical activity:     Days per week: Not on file     Minutes per session: Not on file     "Stress: Not on file   Relationships    Social connections:     Talks on phone: Not on file     Gets together: Not on file     Attends Sabianism service: Not on file     Active member of club or organization: Not on file     Attends meetings of clubs or organizations: Not on file     Relationship status: Not on file   Other Topics Concern    Not on file   Social History Narrative    Not on file       Current Outpatient Medications   Medication Sig Dispense Refill    estradiol (ESTRACE) 0.01 % (0.1 mg/gram) vaginal cream Place 1 g vaginally 3 (three) times a week. 42.5 g 1    ibuprofen (ADVIL,MOTRIN) 200 MG tablet Take 200 mg by mouth every 6 (six) hours as needed for Pain.      FLUCELVAX QUAD 4860-8861, PF, 60 mcg (15 mcg x 4)/0.5 mL Syrg        No current facility-administered medications for this visit.        Review of patient's allergies indicates:  No Known Allergies    Well woman:  Pap test: post GILDA.  History of abnormal paps: No.  History of STIs:  Yes - reports had 1 in college--treated  Mammogram: Date of last: 1/10/19 ([facility]).  Result: Normal  Colonoscopy: none.  Doesn't want one.    DEXA: none    ROS:  As per HPI.      Exam  /60 (BP Location: Left arm, Patient Position: Sitting, BP Method: Large (Manual))   Ht 5' 7" (1.702 m)   Wt 94.3 kg (207 lb 14.3 oz)   BMI 32.56 kg/m²   General: alert and oriented, no acute distress  Respiratory: normal respiratory effort  Abd: soft, non-tender, non-distended    Pelvic--deferred    Impression  No diagnosis found.  We reviewed the above issues and discussed options for short-term versus long-term management of her problems.   Plan:   1. Patient consented for removal of vaginal mesh, possible use of acell, and cystourethroscopy.   R/B/A reviewed. Specific risks reviewed include:  infection, bleeding, need for blood transfusion, damage to surrounding structures, anesthesia risks, death, heart attack, stroke, mesh erosion/extrusion, pain, dyspareunia, " urinary retention, voiding dysfunction, urinary incontinence, exacerbation of urinary urge incontinence, and need for further surgeries.  We reviewed potential for failure of POP defect repair and need for future surgery, with no way of predicting risk.     2. T&S  3. Preoperative appointment with PCP or cardiology: Yes - cleared  4. VTE Prophylaxis:  heparin 5000 u SQ TID (1st dose 2hrs preop) + SCDs  5. Patient instructed on Magnesium citrate and chlorahexadine/dial soap prep to perform day before & AM of surgery.   6. Proceed to OR for above-mentioned procedure.      30 minutes were spent in face to face time with this patient  90 % of this time was spent in counseling and/or coordination of care      ANETTE Francis-BC [facility]  Division of Female Pelvic Medicine and Reconstructive Surgery  Department of Obstetrics & Gynecology

## 2020-02-23 NOTE — PROGRESS NOTES
TITLE OF OPERATION:  1. Complex cystometry.  2. Complex uroflowmetry.  3. Electromyography with surface electrodes.  4. Pressure voiding flow study.  5. Abdominal pressure measurement.  6. Leak point pressure measurement.    INDICATIONS:  1)  Exposure of vaginal mesh:  --s/p GILDA/BSO (menorrhagia) + [facility])              --had MUS for MAXWELL              --MAXWELL improved but did not resolve s/p MUS; pad use decreased from many to 2 PPD with variable wetness (more with cough/sneeze--can feel small gush but less than before MUS); also having some discharge so difficult tell what's on pad              --UUI: occasional               --Daytime frequency: Q 2 hours.  Nocturia: Yes: 1/night.   (--) dysuria,  (--) hematuria,  (--) frequent UTIs.  (+) complete bladder emptying.              --still having some pinkish, malodorous discharge              --sexually active: not major issues since starting estrogen; partner did tell her he felt something rough internally in the past              --no s/sx POP              --no major constipation/diarrhea when eats properly; no hematochezia/FI                Per Dr. Ahuja 10/2019:  Presents with a chronic bloody vaginal discharge with foul odor.  States this started after she had a hysterectomy/BSO and what sounds like a suburethral mesh sling for urinary incontinence.  She had urinary incontinence prior to the surgery, and it did not resolve at all post-op.  She now has spontaneous urinary leakage.  She also has vaginal pain.  Has a foul smelling bloody vaginal discharge.  She is sexually active with her , but sex is painful at the vaginal opening.  Patient used to smoke, but quit a few years ago  EXAM:  Uterus is absent. Right adnexum displays no mass, no tenderness and no fullness. Left adnexum displays no mass, no tenderness and no fullness. No erythema (mucosa pale and thin), tenderness, bleeding, rectocele, cystocele or unspecified prolapse  of vaginal walls in the vagina. No foreign body in the vagina. There are signs of injury (complete exposure of suburethral mesh on the right side of the introitus at 11 o'clock with firm granulomatous vaginal tissue with at least 2x2cm vaginal mucosal defect.  Another area of mesh erosion more medially) in the vagina. Vaginal discharge (yellow with fishy odor) found. Cervix exhibits absence.               --s/p PO flagyl  --using vaginal estrogen    --UG exam:  PELVIC:    External genitalia:  Normal Bartholins, Skenes and labia bilaterally.    Urethra:  No caruncle, diverticulum or masses.  (+) hypermobility.  2 cm area of mesh exposure at right periurethral sulcus with mutiple, large adherent stones, NT. Small stone at mid, posterior urethra.  Left urethra NT, no mesh/other abnormalities palpated.              Vagina:  Atrophy (+) , no bladder masses or tender, no discharge.    Cervix:  absent  Uterus: uterus absent  Adnexa: Not palpable.    PREOPERATIVE DIAGNOSIS:  Erosion of vaginal wall due to surgical mesh, initial encounter    Vaginal atrophy    Mixed stress and urge urinary incontinence      POSTOPERATIVE DIAGNOSIS:  Erosion of vaginal wall due to surgical mesh, initial encounter    Vaginal atrophy    Mixed stress and urge urinary incontinence    Concern for voiding dysfunction  Urodynamic stress incontinence    ANESTHESIA:  None.    SPECIMEN (BACTERIOLOGICAL, PATHOLOGICAL OR OTHER):  None.    PROSTHETIC DEVICE/IMPLANT:  None.    SURGEONS NARRATIVE:  A time out was performed in which the patient identity and procedure were confirmed.  Urodynamic evaluation was performed using a computerized system ([employer]  Uroflowmetry was performed on the patient in the sitting position without catheters in place.  Subsequent urodynamic testing was performed with the patient in the lithotomy position at 45 degrees. Air charged catheters were used with sterile water as the infusion medium. Vesical and  abdominal (rectal) pressures were measured, and detrusor pressure was calculated. EMG activity was recorded with surface electrodes. During filling, room temperature sterile water was infused at a rate of 30 cubic centimeters per minute. The patient was asked cough after instillation of each 100cc volume. Two Valsalva leak point pressures and two cough leak point pressures were performed with the catheters in place at 300 cubic centimeters and again at maximum capacity. Valsalva leak point pressure was defined as the difference between vesical pressure at which leakage was noted (visualized at the external urethral meatus) and the baseline vesical pressure. Following urodynamic testing, a pressure flow study was performed with the patient in the sitting position. Vesical and abdominal pressures were monitored and detrusor pressures were calculated. After the pressure flow study, the catheters were then removed. The patient tolerated the procedure well.     Urine dipstick: neg.    1.  VOIDING PHASE:      a.  Uroflowmetry:   Prolapse reduction: No   Voided volume:  57 mL    Voiding time:   14 seconds   Max flow:  7 mL/s   Avg flow:   4 mL/s    PVR:   20 mL    The overall configuration of this uroflow study was with insufficient volume for interpretation.      b.  Pressure flow:   Prolapse reduction: No   Voided volume:   295 mL   Voiding time:   105 seconds   Peak flow:  13 mL/s    Avg flow:  3 mL/s   Max det pressure:  42  cm H20   Det pressure at max flow: 28 cm H20   Void initiated by prolonged detrusor contraction.     Urethral relaxation (EMG): absent.     PVR (calculated):  199 mL.  All catheters were removed, and the patient was allowed to void on toilet, with 225 mL produced.     The overall configuration of this pressure flow study was prolonged with concern for voiding dysfunction.      2.  FILLING PHASE:   1st desire: 91 mL   Normal desire:  204 mL   Strong desire:  426 mL   Urgency:   500 mL   Compliance (calculated)  ~50 mL/cm H20   EMG activity during filling: with gradual increase   Detrusor contractions observed: No.     3.  URETHRAL FUNCTION/STORAGE PHASE:    a.  WITHOUT prolapse reduction:   CLPP (150 mL): Negative  at  208 cm H20   VLPP (150 mL): Negative  at  126 cm H20    CLPP (300 mL): Positive  at  192 cm H20   VLPP (300 mL): Positive  at  111 cm H20    CLPP (450 mL): Positive  at  192 cm H20   VLPP (450 mL): Positive  at  126 cm H20     These findings are consistent with Positive urodynamic stress incontinence.    Assessment:  UF was with insufficient volume for intpretation.  PF prolonged with concern for voiding dysfunction.  Compliance normal.  Max capacity 500 mL.  DO (--).  MAXWELL (+).      Plan:  1)  Vaginal mesh exposure:  --exam consistent with mesh exposure along right sing path with adherent stones  --patient not having pain but having significant, malodorous discharge  --plan mesh/stone excision:              --get op notes: release signed              --consider need to release R arm/midline only vs B sides; will assess intraop; patient aware that UI, ita MAXWELL, could worsen postop and require future Tx              --cystoscopy normal today; UDS + MAXWELL--patient understands this may worsen and warrant future treatments               --discussed fact that initial sling was placed while still smoking (major RF for poor mesh healing) and vaginal atrophy              --needs preop clearance from PCP (Marlene Dyer) + preop labs CBC, BMP, EKG, T&S     2)  Vaginal atrophy:  Continue using vaginal estrogen.      3)  Mixed urinary incontinence, stress > urge:  --urine C&S  --Empty bladder every 3 hours.  Empty well: wait a minute, lean forward on toilet.    --Avoid dietary irritants (see sheet).  Keep diary x 3-5 days to determine your irritants.  --KEGELS: do 10 in AM and 10 in PM, holding each x 10 seconds.  When you feel urge to go, STOP, KEGEL, and when urge has passed, then  go to bathroom.  Consider PT in future.    --URGE: consider medication in the future.  Takes 2-4 weeks to see if will have effect.  For dry mouth: get sour, sugar free lozenge or gum.    --STRESS:  Pessary vs. Sling.   --if mesh is removed, patient understands  that UI, ita MAXWELL, could worsen postop and require future Tx     4)  Needs to sign consents for 3/2/2020 OR.   --------------------------------    Title of Operation:   Cystourethroscopy.     INDICATIONS:  As above    PREOPERATIVE DIAGNOSIS  As above    POSTOPERATIVE DIAGNOSIS:   As above    Anesthesia:   2% Xylocaine gel.    Specimen (Bacteriological, Pathological or other):   None.     Prosthetic Device/Implant:   None.     Surgeons Narrative:     After informed consent was obtained, the patient was placed in the lithotomy position. The urethral meatus was prepped with Betadine and 10 cubic centimeters of 2% Xylocaine gel were introduced into the urethra. A flexible cystourethroscope was introduced into the bladder. The bladder was distended with approximately 300 cubic centimeters of sterile water. A systematic survey was performed in which the bladder was surveyed using multiple sequential passes in a clockwise fashion from the bladder dome to the bladder base to the urethrovesical junction. The trigone and ureteral orifices were observed. The scope was then flipped back on itself, and the urethrovesical junction was viewed. A vaginal examining finger was then placed with pressure suburethrally at the urethrovesical junction as the telescope was withdrawn in order to perform positive pressure urethroscopy.  Standard maneuvers of cough, squeeze and Valsalva were performed. The telescope was then completely withdrawn.     Findings: Performed per Dr. Barnett.  Urethroscopy:  Normal.  Cystoscopy:  Normal bladder mucosa, bilateral ureteral flow was noted.     Assessment: Essentially normal cystourethroscopy.     Plan: The patient will follow up with   Eliazar as scheduled.  See urodynamics note from 2/19/2020 for further plan details.

## 2020-02-28 ENCOUNTER — TELEPHONE (OUTPATIENT)
Dept: UROGYNECOLOGY | Facility: CLINIC | Age: 60
End: 2020-02-28

## 2020-02-28 NOTE — TELEPHONE ENCOUNTER
Attempted to contact pt to inform her she has a 9 am arrival time for surgery on Monday 3/2/2020 no answer unable to leave message vm not steup.

## 2020-02-28 NOTE — TELEPHONE ENCOUNTER
Spoke to pt and confirmed 9 am arrival time for surgery on 3/2, pt voiced understanding and call was ended.

## 2020-03-02 ENCOUNTER — HOSPITAL ENCOUNTER (OUTPATIENT)
Facility: OTHER | Age: 60
Discharge: HOME | End: 2020-03-02
Attending: OBSTETRICS & GYNECOLOGY | Admitting: OBSTETRICS & GYNECOLOGY
Payer: COMMERCIAL

## 2020-03-02 ENCOUNTER — ANESTHESIA (OUTPATIENT)
Dept: SURGERY | Facility: OTHER | Age: 60
End: 2020-03-02
Payer: COMMERCIAL

## 2020-03-02 VITALS
TEMPERATURE: 98 F | RESPIRATION RATE: 18 BRPM | DIASTOLIC BLOOD PRESSURE: 65 MMHG | OXYGEN SATURATION: 94 % | HEART RATE: 88 BPM | WEIGHT: 207 LBS | HEIGHT: 67 IN | BODY MASS INDEX: 32.49 KG/M2 | SYSTOLIC BLOOD PRESSURE: 132 MMHG

## 2020-03-02 DIAGNOSIS — T83.721A EXPOSURE OF IMPLANTED VAGINAL MESH: ICD-10-CM

## 2020-03-02 DIAGNOSIS — T83.711A: Primary | ICD-10-CM

## 2020-03-02 PROCEDURE — 63600175 PHARM REV CODE 636 W HCPCS: Performed by: OBSTETRICS & GYNECOLOGY

## 2020-03-02 PROCEDURE — 71000033 HC RECOVERY, INTIAL HOUR: Performed by: OBSTETRICS & GYNECOLOGY

## 2020-03-02 PROCEDURE — 37000009 HC ANESTHESIA EA ADD 15 MINS: Performed by: OBSTETRICS & GYNECOLOGY

## 2020-03-02 PROCEDURE — 53500 URETHRLYS TRANSVAG W/ SCOPE: CPT | Mod: ,,, | Performed by: OBSTETRICS & GYNECOLOGY

## 2020-03-02 PROCEDURE — 63600175 PHARM REV CODE 636 W HCPCS: Performed by: ANESTHESIOLOGY

## 2020-03-02 PROCEDURE — 25000242 PHARM REV CODE 250 ALT 637 W/ HCPCS: Performed by: ANESTHESIOLOGY

## 2020-03-02 PROCEDURE — 25000003 PHARM REV CODE 250: Performed by: NURSE ANESTHETIST, CERTIFIED REGISTERED

## 2020-03-02 PROCEDURE — 53500 PR URETHRLYS, TRANSVAG W/ SCOPE: ICD-10-PCS | Mod: ,,, | Performed by: OBSTETRICS & GYNECOLOGY

## 2020-03-02 PROCEDURE — 57287 REVISE/REMOVE SLING REPAIR: CPT | Mod: 51,,, | Performed by: OBSTETRICS & GYNECOLOGY

## 2020-03-02 PROCEDURE — 63600175 PHARM REV CODE 636 W HCPCS: Performed by: NURSE ANESTHETIST, CERTIFIED REGISTERED

## 2020-03-02 PROCEDURE — 71000015 HC POSTOP RECOV 1ST HR: Performed by: OBSTETRICS & GYNECOLOGY

## 2020-03-02 PROCEDURE — 88300 SURGICAL PATH GROSS: CPT | Mod: 26,,, | Performed by: PATHOLOGY

## 2020-03-02 PROCEDURE — 88300 SURGICAL PATH GROSS: CPT | Performed by: PATHOLOGY

## 2020-03-02 PROCEDURE — 25000003 PHARM REV CODE 250: Performed by: ANESTHESIOLOGY

## 2020-03-02 PROCEDURE — 88300 PR  SURG PATH,GROSS,LEVEL I: ICD-10-PCS | Mod: 26,,, | Performed by: PATHOLOGY

## 2020-03-02 PROCEDURE — 36000707: Performed by: OBSTETRICS & GYNECOLOGY

## 2020-03-02 PROCEDURE — 25000003 PHARM REV CODE 250: Performed by: OBSTETRICS & GYNECOLOGY

## 2020-03-02 PROCEDURE — 37000008 HC ANESTHESIA 1ST 15 MINUTES: Performed by: OBSTETRICS & GYNECOLOGY

## 2020-03-02 PROCEDURE — 71000016 HC POSTOP RECOV ADDL HR: Performed by: OBSTETRICS & GYNECOLOGY

## 2020-03-02 PROCEDURE — 57287 PR REMV/REVS SLING FOR STRES INCONTINENCE: ICD-10-PCS | Mod: 51,,, | Performed by: OBSTETRICS & GYNECOLOGY

## 2020-03-02 PROCEDURE — 36000706: Performed by: OBSTETRICS & GYNECOLOGY

## 2020-03-02 RX ORDER — CELECOXIB 200 MG/1
400 CAPSULE ORAL
Status: COMPLETED | OUTPATIENT
Start: 2020-03-02 | End: 2020-03-02

## 2020-03-02 RX ORDER — GLYCOPYRROLATE 0.2 MG/ML
INJECTION INTRAMUSCULAR; INTRAVENOUS
Status: DISCONTINUED | OUTPATIENT
Start: 2020-03-02 | End: 2020-03-02

## 2020-03-02 RX ORDER — OXYCODONE HYDROCHLORIDE 5 MG/1
5 TABLET ORAL
Status: DISCONTINUED | OUTPATIENT
Start: 2020-03-02 | End: 2020-03-02

## 2020-03-02 RX ORDER — SODIUM CHLORIDE, SODIUM LACTATE, POTASSIUM CHLORIDE, CALCIUM CHLORIDE 600; 310; 30; 20 MG/100ML; MG/100ML; MG/100ML; MG/100ML
INJECTION, SOLUTION INTRAVENOUS CONTINUOUS
Status: DISCONTINUED | OUTPATIENT
Start: 2020-03-02 | End: 2020-03-02

## 2020-03-02 RX ORDER — HYDROCODONE BITARTRATE AND ACETAMINOPHEN 5; 325 MG/1; MG/1
1 TABLET ORAL EVERY 6 HOURS PRN
Qty: 15 TABLET | Refills: 0 | Status: SHIPPED | OUTPATIENT
Start: 2020-03-02 | End: 2020-09-08

## 2020-03-02 RX ORDER — HYDROCODONE BITARTRATE AND ACETAMINOPHEN 10; 325 MG/1; MG/1
1 TABLET ORAL EVERY 4 HOURS PRN
Status: DISCONTINUED | OUTPATIENT
Start: 2020-03-02 | End: 2020-03-02 | Stop reason: HOSPADM

## 2020-03-02 RX ORDER — PREGABALIN 50 MG/1
50 CAPSULE ORAL
Status: COMPLETED | OUTPATIENT
Start: 2020-03-02 | End: 2020-03-02

## 2020-03-02 RX ORDER — EPHEDRINE SULFATE 50 MG/ML
INJECTION, SOLUTION INTRAVENOUS
Status: DISCONTINUED | OUTPATIENT
Start: 2020-03-02 | End: 2020-03-02

## 2020-03-02 RX ORDER — MEPERIDINE HYDROCHLORIDE 25 MG/ML
12.5 INJECTION INTRAMUSCULAR; INTRAVENOUS; SUBCUTANEOUS ONCE AS NEEDED
Status: DISCONTINUED | OUTPATIENT
Start: 2020-03-02 | End: 2020-03-02

## 2020-03-02 RX ORDER — HYDROMORPHONE HYDROCHLORIDE 2 MG/ML
0.4 INJECTION, SOLUTION INTRAMUSCULAR; INTRAVENOUS; SUBCUTANEOUS EVERY 5 MIN PRN
Status: DISCONTINUED | OUTPATIENT
Start: 2020-03-02 | End: 2020-03-02

## 2020-03-02 RX ORDER — HYDROCODONE BITARTRATE AND ACETAMINOPHEN 5; 325 MG/1; MG/1
1 TABLET ORAL EVERY 4 HOURS PRN
Status: DISCONTINUED | OUTPATIENT
Start: 2020-03-02 | End: 2020-03-02 | Stop reason: HOSPADM

## 2020-03-02 RX ORDER — ALBUTEROL SULFATE 0.83 MG/ML
2.5 SOLUTION RESPIRATORY (INHALATION)
Status: COMPLETED | OUTPATIENT
Start: 2020-03-02 | End: 2020-03-02

## 2020-03-02 RX ORDER — ONDANSETRON 2 MG/ML
4 INJECTION INTRAMUSCULAR; INTRAVENOUS DAILY PRN
Status: DISCONTINUED | OUTPATIENT
Start: 2020-03-02 | End: 2020-03-02

## 2020-03-02 RX ORDER — ACETAMINOPHEN 500 MG
1000 TABLET ORAL
Status: COMPLETED | OUTPATIENT
Start: 2020-03-02 | End: 2020-03-02

## 2020-03-02 RX ORDER — LIDOCAINE HCL/PF 100 MG/5ML
SYRINGE (ML) INTRAVENOUS
Status: DISCONTINUED | OUTPATIENT
Start: 2020-03-02 | End: 2020-03-02

## 2020-03-02 RX ORDER — PROPOFOL 10 MG/ML
VIAL (ML) INTRAVENOUS
Status: DISCONTINUED | OUTPATIENT
Start: 2020-03-02 | End: 2020-03-02

## 2020-03-02 RX ORDER — DIPHENHYDRAMINE HYDROCHLORIDE 50 MG/ML
25 INJECTION INTRAMUSCULAR; INTRAVENOUS EVERY 4 HOURS PRN
Status: DISCONTINUED | OUTPATIENT
Start: 2020-03-02 | End: 2020-03-02 | Stop reason: HOSPADM

## 2020-03-02 RX ORDER — FENTANYL CITRATE 50 UG/ML
INJECTION, SOLUTION INTRAMUSCULAR; INTRAVENOUS
Status: DISCONTINUED | OUTPATIENT
Start: 2020-03-02 | End: 2020-03-02

## 2020-03-02 RX ORDER — IBUPROFEN 600 MG/1
600 TABLET ORAL 3 TIMES DAILY
Qty: 30 TABLET | Refills: 1 | Status: SHIPPED | OUTPATIENT
Start: 2020-03-02 | End: 2020-09-08

## 2020-03-02 RX ORDER — DEXAMETHASONE SODIUM PHOSPHATE 4 MG/ML
INJECTION, SOLUTION INTRA-ARTICULAR; INTRALESIONAL; INTRAMUSCULAR; INTRAVENOUS; SOFT TISSUE
Status: DISCONTINUED | OUTPATIENT
Start: 2020-03-02 | End: 2020-03-02

## 2020-03-02 RX ORDER — SODIUM CHLORIDE 0.9 % (FLUSH) 0.9 %
3 SYRINGE (ML) INJECTION
Status: DISCONTINUED | OUTPATIENT
Start: 2020-03-02 | End: 2020-03-02

## 2020-03-02 RX ORDER — CEFAZOLIN SODIUM 1 G/3ML
2 INJECTION, POWDER, FOR SOLUTION INTRAMUSCULAR; INTRAVENOUS
Status: DISCONTINUED | OUTPATIENT
Start: 2020-03-02 | End: 2020-03-02

## 2020-03-02 RX ORDER — ALBUTEROL SULFATE 0.83 MG/ML
2.5 SOLUTION RESPIRATORY (INHALATION)
Status: DISCONTINUED | OUTPATIENT
Start: 2020-03-02 | End: 2020-03-02

## 2020-03-02 RX ORDER — ONDANSETRON HYDROCHLORIDE 2 MG/ML
INJECTION, SOLUTION INTRAMUSCULAR; INTRAVENOUS
Status: DISCONTINUED | OUTPATIENT
Start: 2020-03-02 | End: 2020-03-02

## 2020-03-02 RX ORDER — IBUPROFEN 600 MG/1
600 TABLET ORAL EVERY 6 HOURS PRN
Status: DISCONTINUED | OUTPATIENT
Start: 2020-03-02 | End: 2020-03-02 | Stop reason: HOSPADM

## 2020-03-02 RX ORDER — DIPHENHYDRAMINE HCL 25 MG
25 CAPSULE ORAL EVERY 4 HOURS PRN
Status: DISCONTINUED | OUTPATIENT
Start: 2020-03-02 | End: 2020-03-02 | Stop reason: HOSPADM

## 2020-03-02 RX ADMIN — FENTANYL CITRATE 100 MCG: 50 INJECTION, SOLUTION INTRAMUSCULAR; INTRAVENOUS at 12:03

## 2020-03-02 RX ADMIN — PROPOFOL 200 MG: 10 INJECTION, EMULSION INTRAVENOUS at 12:03

## 2020-03-02 RX ADMIN — SODIUM CHLORIDE, SODIUM LACTATE, POTASSIUM CHLORIDE, AND CALCIUM CHLORIDE: 600; 310; 30; 20 INJECTION, SOLUTION INTRAVENOUS at 01:03

## 2020-03-02 RX ADMIN — ONDANSETRON 4 MG: 2 INJECTION, SOLUTION INTRAMUSCULAR; INTRAVENOUS at 12:03

## 2020-03-02 RX ADMIN — ALBUTEROL SULFATE 2.5 MG: 2.5 SOLUTION RESPIRATORY (INHALATION) at 09:03

## 2020-03-02 RX ADMIN — CELECOXIB 400 MG: 200 CAPSULE ORAL at 09:03

## 2020-03-02 RX ADMIN — DEXAMETHASONE SODIUM PHOSPHATE 8 MG: 4 INJECTION, SOLUTION INTRAMUSCULAR; INTRAVENOUS at 12:03

## 2020-03-02 RX ADMIN — SODIUM CHLORIDE, SODIUM LACTATE, POTASSIUM CHLORIDE, AND CALCIUM CHLORIDE: 600; 310; 30; 20 INJECTION, SOLUTION INTRAVENOUS at 11:03

## 2020-03-02 RX ADMIN — ACETAMINOPHEN 1000 MG: 500 TABLET, FILM COATED ORAL at 09:03

## 2020-03-02 RX ADMIN — PREGABALIN 50 MG: 50 CAPSULE ORAL at 09:03

## 2020-03-02 RX ADMIN — CARBOXYMETHYLCELLULOSE SODIUM 2 DROP: 2.5 SOLUTION/ DROPS OPHTHALMIC at 12:03

## 2020-03-02 RX ADMIN — EPHEDRINE SULFATE 10 MG: 50 INJECTION INTRAMUSCULAR; INTRAVENOUS; SUBCUTANEOUS at 12:03

## 2020-03-02 RX ADMIN — GLYCOPYRROLATE 0.2 MG: 0.2 INJECTION, SOLUTION INTRAMUSCULAR; INTRAVENOUS at 12:03

## 2020-03-02 RX ADMIN — PROPOFOL 30 MG: 10 INJECTION, EMULSION INTRAVENOUS at 01:03

## 2020-03-02 RX ADMIN — CEFAZOLIN 2 G: 330 INJECTION, POWDER, FOR SOLUTION INTRAMUSCULAR; INTRAVENOUS at 12:03

## 2020-03-02 RX ADMIN — LIDOCAINE HYDROCHLORIDE 60 MG: 20 INJECTION, SOLUTION INTRAVENOUS at 12:03

## 2020-03-02 NOTE — ANESTHESIA POSTPROCEDURE EVALUATION
Anesthesia Post Evaluation    Patient: Brianna Brand    Procedure(s) Performed: Procedure(s) (LRB):  EXPLANT,  IMPLANTED DEVICE (N/A)  CYSTOSCOPY (N/A)    Final Anesthesia Type: general    Patient location during evaluation: PACU  Patient participation: Yes- Able to Participate  Level of consciousness: awake and alert  Post-procedure vital signs: reviewed and stable  Pain management: adequate  Airway patency: patent  HARSHAD mitigation strategies: Extubation while patient is awake  PONV status at discharge: No PONV  Anesthetic complications: no      Cardiovascular status: hemodynamically stable  Respiratory status: unassisted  Hydration status: euvolemic  Follow-up not needed.          Vitals Value Taken Time   /65 3/2/2020  2:55 PM   Temp 36.6 °C (97.9 °F) 3/2/2020  2:25 PM   Pulse 88 3/2/2020  2:55 PM   Resp 18 3/2/2020  2:55 PM   SpO2 94 % 3/2/2020  2:25 PM         Event Time     Out of Recovery 14:18:00          Pain/Joy Score: Pain Rating Prior to Med Admin: 0 (3/2/2020  1:39 PM)  Joy Score: 10 (3/2/2020  2:55 PM)

## 2020-03-02 NOTE — INTERVAL H&P NOTE
The patient has been examined and the H&P has been reviewed:    I concur with the findings and no changes have occurred since H&P was written.    Anesthesia/Surgery risks, benefits and alternative options discussed and understood by patient/family.      Active Hospital Problems    Diagnosis  POA    Exposure of implanted vaginal mesh [T83.720J]  Yes      Resolved Hospital Problems   No resolved problems to display.

## 2020-03-02 NOTE — TRANSFER OF CARE
"Anesthesia Transfer of Care Note    Patient: Brianna Brand    Procedure(s) Performed: Procedure(s) (LRB):  EXPLANT,  IMPLANTED DEVICE (N/A)  CYSTOSCOPY (N/A)    Patient location: PACU    Anesthesia Type: general    Transport from OR: Transported from OR on 2-3 L/min O2 by NC with adequate spontaneous ventilation    Post pain: adequate analgesia    Post assessment: no apparent anesthetic complications    Post vital signs: stable    Level of consciousness: awake and alert    Nausea/Vomiting: no nausea/vomiting    Complications: none    Transfer of care protocol was followed      Last vitals:   Visit Vitals  /64 (BP Location: Right arm, Patient Position: Lying)   Pulse 73   Temp 36.6 °C (97.8 °F) (Oral)   Resp 16   Ht 5' 7" (1.702 m)   Wt 93.9 kg (207 lb)   SpO2 (!) 94%   Breastfeeding? No   BMI 32.42 kg/m²     "

## 2020-03-02 NOTE — ANESTHESIA PROCEDURE NOTES
Intubation  Performed by: Maggie Mosley CRNA  Authorized by: Kwasi Child MD     Intubation:     Induction:  Intravenous    Intubated:  Postinduction    Mask Ventilation:  Easy mask    Attempts:  1    Attempted By:  CRNA    Method of Intubation:  Direct    Difficult Airway Encountered?: No      Complications:  None    Airway Device:  Supraglottic airway/LMA    Airway Device Size:  4.0    Style/Cuff Inflation:  Cuffed    Inflation Amount (mL):  10    Secured at:  The lips    Placement Verified By:  Capnometry    Complicating Factors:  None    Findings Post-Intubation:  Atraumatic/condition of teeth unchanged

## 2020-03-02 NOTE — PROGRESS NOTES
Spoke with Dr. Hall. Vag packing to be removed now and passive voiding trial will start. Will continue to monitor.

## 2020-03-02 NOTE — PLAN OF CARE
Brianna Aguayo Sunday has met all discharge criteria from Phase II. Vital Signs are stable, ambulating  without difficulty. Discharge instructions given, patient verbalized understanding. Discharged from facility via wheelchair in stable condition.

## 2020-03-02 NOTE — DISCHARGE INSTRUCTIONS
After the procedure  If you had a sedative, general anesthesia, or spinal anesthesia, you must have someone drive you home. Once youre home:  · Drink plenty of fluids.  · You may have burning or light bleeding when you urinate--this is normal.  · Medicines may be prescribed to ease any discomfort or prevent infection. Take these as directed.  · Call your doctor if you have heavy bleeding or blood clots, burning that lasts more than a day, a fever over 100°F  (38° C), or trouble urinating.  Date Last Reviewed: 1/1/2017 © 2000-2017 [employer]. [address] [place] [address], [place] [address]. All rights reserved. This information is not intended as a substitute for professional medical care. Always follow your healthcare professional's instructions.      Discharge Instructions: After Your Surgery  Youve just had surgery. During surgery, you were given medicine called anesthesia to keep you relaxed and free of pain. After surgery, you may have some pain or nausea. This is common. Here are some tips for feeling better and getting well after surgery.     Stay on schedule with your medicine.   Going home  Your healthcare provider will show you how to take care of yourself when you go home. He or she will also answer your questions. Have an adult family member or friend drive you home. For the first 24 hours after your surgery:  · Do not drive or use heavy equipment.  · Do not make important decisions or sign legal papers.  · Do not drink alcohol.  · Have someone stay with you, if needed. He or she can watch for problems and help keep you safe.  Be sure to go to all follow-up visits with your healthcare provider. And rest after your surgery for as long as your healthcare provider tells you to.  Coping with pain  If you have pain after surgery, pain medicine will help you feel better. Take it as told, before pain becomes severe. Also, ask your healthcare provider or pharmacist about other ways to control pain.  This might be with heat, ice, or relaxation. And follow any other instructions your surgeon or nurse gives you.  Tips for taking pain medicine  To get the best relief possible, remember these points:  · Pain medicines can upset your stomach. Taking them with a little food may help.  · Most pain relievers taken by mouth need at least 20 to 30 minutes to start to work.  · Taking medicine on a schedule can help you remember to take it. Try to time your medicine so that you can take it before starting an activity. This might be before you get dressed, go for a walk, or sit down for dinner.  · Constipation is a common side effect of pain medicines. Call your healthcare provider before taking any medicines such as laxatives or stool softeners to help ease constipation. Also ask if you should skip any foods. Drinking lots of fluids and eating foods such as fruits and vegetables that are high in fiber can also help. Remember, do not take laxatives unless your surgeon has prescribed them.  · Drinking alcohol and taking pain medicine can cause dizziness and slow your breathing. It can even be deadly. Do not drink alcohol while taking pain medicine.  · Pain medicine can make you react more slowly to things. Do not drive or run machinery while taking pain medicine.  Your healthcare provider may tell you to take acetaminophen to help ease your pain. Ask him or her how much you are supposed to take each day. Acetaminophen or other pain relievers may interact with your prescription medicines or other over-the-counter (OTC) medicines. Some prescription medicines have acetaminophen and other ingredients. Using both prescription and OTC acetaminophen for pain can cause you to overdose. Read the labels on your OTC medicines with care. This will help you to clearly know the list of ingredients, how much to take, and any warnings. It may also help you not take too much acetaminophen. If you have questions or do not understand the  information, ask your pharmacist or healthcare provider to explain it to you before you take the OTC medicine.  Managing nausea  Some people have an upset stomach after surgery. This is often because of anesthesia, pain, or pain medicine, or the stress of surgery. These tips will help you handle nausea and eat healthy foods as you get better. If you were on a special food plan before surgery, ask your healthcare provider if you should follow it while you get better. These tips may help:  · Do not push yourself to eat. Your body will tell you when to eat and how much.  · Start off with clear liquids and soup. They are easier to digest.  · Next try semi-solid foods, such as mashed potatoes, applesauce, and gelatin, as you feel ready.  · Slowly move to solid foods. Dont eat fatty, rich, or spicy foods at first.  · Do not force yourself to have 3 large meals a day. Instead eat smaller amounts more often.  · Take pain medicines with a small amount of solid food, such as crackers or toast, to avoid nausea.     Call your surgeon if  · You still have pain an hour after taking medicine. The medicine may not be strong enough.  · You feel too sleepy, dizzy, or groggy. The medicine may be too strong.  · You have side effects like nausea, vomiting, or skin changes, such as rash, itching, or hives.       If you have obstructive sleep apnea  You were given anesthesia medicine during surgery to keep you comfortable and free of pain. After surgery, you may have more apnea spells because of this medicine and other medicines you were given. The spells may last longer than usual.   At home:  · Keep using the continuous positive airway pressure (CPAP) device when you sleep. Unless your healthcare provider tells you not to, use it when you sleep, day or night. CPAP is a common device used to treat obstructive sleep apnea.  · Talk with your provider before taking any pain medicine, muscle relaxants, or sedatives. Your provider will tell  you about the possible dangers of taking these medicines.  Date Last Reviewed: 12/1/2016  © 1560-5254 [employer]. [address] [place] [address], [place] [address]. All rights reserved. This information is not intended as a substitute for professional medical care. Always follow your healthcare professional's instructions.      Follow any instructions given to you by Dr. Perea!!

## 2020-03-03 NOTE — DISCHARGE SUMMARY
[facility]  Discharge Note  Short Stay    Procedure(s) (LRB):  EXPLANT,  IMPLANTED DEVICE (N/A)  CYSTOSCOPY (N/A)    OUTCOME: Patient tolerated treatment/procedure well without complication and is now ready for discharge.    DISPOSITION: Home or Self Care    FINAL DIAGNOSIS:  Exposure of implanted vaginal mesh    FOLLOWUP: In clinic    DISCHARGE INSTRUCTIONS:  No discharge procedures on file.     Clinical Reference Documents Added to Patient Instructions       Document    AFTER YOUR SURGERY: DISCHARGE INSTRUCTIONS (ENGLISH)    CYSTOSCOPY (ENGLISH)

## 2020-03-03 NOTE — OP NOTE
Title of Operation:  1)  Excision of vaginal mesh  2)  Bilateral urethrolysis  3)  Cystourethroscopy    Indications for Surgery:  1)  Exposure of vaginal mesh:  --s/p GILDA/BSO (menorrhagia) + MUS ~[facility] [place] ([facility])              --had MUS for MAXWELL              --MAXWELL improved but did not resolve s/p MUS; pad use decreased from many to 2 PPD with variable wetness (more with cough/sneeze--can feel small gush but less than before MUS); also having some discharge so difficult tell what's on pad  --2/23/2020 UDS:  3.  URETHRAL FUNCTION/STORAGE PHASE:     a.  WITHOUT prolapse reduction:  · CLPP (150 mL): Negative  at  208 cm H20  · VLPP (150 mL): Negative  at  126 cm H20   · CLPP (300 mL): Positive  at  192 cm H20  · VLPP (300 mL): Positive  at  111 cm H20   · CLPP (450 mL): Positive  at  192 cm H20  · VLPP (450 mL): Positive  at  126 cm H20      These findings are consistent with Positive urodynamic stress incontinence.     Assessment:  UF was with insufficient volume for intpretation.  PF prolonged with concern for voiding dysfunction.  Compliance normal.  Max capacity 500 mL.  DO (--).  MAXWELL (+).    --cysto: normal              --UUI: occasional               --Daytime frequency: Q 2 hours.  Nocturia: Yes: 1/night.   (--) dysuria,  (--) hematuria,  (--) frequent UTIs.  (+) complete bladder emptying.              --still having some pinkish, malodorous discharge              --sexually active: not major issues since starting estrogen; partner did tell her he felt something rough internally in the past              --no s/sx POP              --no major constipation/diarrhea when eats properly; no hematochezia/FI     Per Dr. Ahuja 10/2019:  Presents with a chronic bloody vaginal discharge with foul odor.  States this started after she had a hysterectomy/BSO and what sounds like a suburethral mesh sling for urinary incontinence.  She had urinary incontinence prior to the surgery, and it did not resolve at all  post-op.  She now has spontaneous urinary leakage.  She also has vaginal pain.  Has a foul smelling bloody vaginal discharge.  She is sexually active with her , but sex is painful at the vaginal opening.  Patient used to smoke, but quit a few years ago  EXAM:  Uterus is absent. Right adnexum displays no mass, no tenderness and no fullness. Left adnexum displays no mass, no tenderness and no fullness. No erythema (mucosa pale and thin), tenderness, bleeding, rectocele, cystocele or unspecified prolapse of vaginal walls in the vagina. No foreign body in the vagina. There are signs of injury (complete exposure of suburethral mesh on the right side of the introitus at 11 o'clock with firm granulomatous vaginal tissue with at least 2x2cm vaginal mucosal defect.  Another area of mesh erosion more medially) in the vagina. Vaginal discharge (yellow with fishy odor) found. Cervix exhibits absence.               --s/p PO flagyl  --using vaginal estrogen     --UG exam:  PELVIC:    External genitalia:  Normal Bartholins, Skenes and labia bilaterally.    Urethra:  No caruncle, diverticulum or masses.  (+) hypermobility.  2 cm area of mesh exposure at right periurethral sulcus with mutiple, large adherent stones, NT. Small stone at mid, posterior urethra.  Left urethra NT, no mesh/other abnormalities palpated.              Vagina:  Atrophy (+) , no bladder masses or tender, no discharge.    Cervix:  absent  Uterus: uterus absent  Adnexa: Not palpable.    Preoperative Diagnosis:  Erosion of vaginal wall due to surgical mesh, initial encounter    Vaginal atrophy    Mixed stress and urge urinary incontinence    Concern for voiding dysfunction  Urodynamic stress incontinence    Postoperative Diagnosis:  Erosion of vaginal wall due to surgical mesh, initial encounter    Vaginal atrophy    Mixed stress and urge urinary incontinence    Concern for voiding dysfunction  Urodynamic stress incontinence      Anesthesia:  General  endotracheal anesthesia.     Specimen (Bacteriological, Pathological or other):  3 cm of excised mesh sling + multiple adherent stones    Prosthetic Device/Implant:  none    Surgeons Narrative:    Surgeon: Brian Perea MD    Assistants:  Jennifer Hall MD ([id number])     Intravenous Fluids:  1000 mL     Estimated Blood Loss:  25 mL     Urine Output:  200 mL     Counts:  Sponge, lap, needle counts correct x 2.     Drains: Hamilton catheter.     Disposition:  The patient was sent to the PACU in stable condition.     Findings:     1.  On exam under anesthesia,  normal external female genitalia.  Uterus and cervix: Absent  Adnexa: non palpable. Urethra:  No caruncle, diverticulum or masses.  (+) hypermobility.  2 cm area of mesh exposure at right periurethral sulcus with mutiple, large adherent stones, NT. Small stone at mid, posterior urethra.  Left urethra NT, no mesh/other abnormalities palpated.     2.  On cystoscopy, the bladder mucosa was Normal.  After excision of vaginal mesh + stones and closure of urethral epithelial defect, there was no suture or mesh within the bladder mucosa.  The ureteral orifices were visualized bilaterally with (+) noted good efflux x 2. On a systematic survey of the bladder dome to the base of the urethrovesical junction, there were not other abnormalities noted. The urethra was normal on retraction of the scope, without any evidence of suture or injury, after mesh excision and closure of epithelial defect.     Description of procedure:    The patient was identified in the preoperative area where informed consent was confirmed, and she was taken to the operating room where an adequate level of general anesthesia was obtained.  The patient was positioned in lithotomy position with legs in Jose Martin stirrups. Care was taken to avoid joint hyperflexion or hyperextension, and all extremity surfaces were carefully padded so as to minimize risk of neurologic injury. Intravenous antibiotics were  administered preoperatively. Sequential compression devices were applied to the patient's lower extremities preoperatively and heparin was administered subcutaneously for VTE prophylaxis.  Surgical time-out was performed, where the patient was identified and procedures confirmed.  An examination under anesthesia was performed with findings described as above.  The patient's abdomen, perineum, and vagina were sterilely prepped and draped. A nath catheter was placed in the bladder for drainage.      The procedure commenced with exam under anesthesia.  Findings are as above noted.  A right angle clamp was used to undermine the exposed mesh, which ran from the suburethra to the right periurethral sulcus.  The mesh was transected in the midline, and the left arm was followed left-andrew.  At this point, even though the mesh was under the epithelium, it was noted that the entire tract along the left was epithelialized to the level of the arc of the pubic ramus.  Therefore, the sling was followed to this insertion point, and the mesh was trimmed just beneath the arc of the pubic ramus.  Next, the left sling arm was also severed near it's insertion at the left arc of the pubic ramus.  Of note, the left sling arm was never buried beneath the epithelium and was free around the urethra, as the skin beneath the left sling arm had epithelialized completely to the arc of the pubic ramus. Both pieces of excised mesh (~3 cm) + adherent stones were submitted to pathology for further evaluation. At this point, all areas of dissection were irrigated well.  The epithelial edges were further released from the underlying urethra, using Metzenbaum scissors, to allow re-closure of the epithelial defects.  All epithelial defects along the urethra were closed using mattress sutures of 2-0 vicryl.  Excellent hemostasis was noted.  At this point, the nath catheter was removed, and cystourethroscopy was performed.  Normal findings are as above  noted.  The nath catheter was then replaced.     At the close of the case, all counts were correct x2.  The vagina was irrigated, and all irrigants were removed.  The vagina was packed with a role of Kerlix, coated with KY jelly, for assurance of immediate postop hemostasis.  The Nath was in place and draining well.  The patient was awakened from general endotracheal anesthesia and was taken to the Recovery Room in stable condition.  She tolerated the procedure well.    I was present and scrubbed for the entire procedure.

## 2020-03-07 ENCOUNTER — TELEPHONE (OUTPATIENT)
Dept: UROGYNECOLOGY | Facility: CLINIC | Age: 60
End: 2020-03-07

## 2020-03-07 NOTE — TELEPHONE ENCOUNTER
Spoke with patient, she had some vaginal bleeding after straining with a bowel movement.  After 10 minutes she notice that she had blood running down her legs. She went to the restroom and passed two small clots. She then took a shower and put on a pad. She changed the pad after 10 minutes because it was half saturated. She placed a second pad checked it 30 minutes later it had a slightly larger than a quarter amount of blood. She denies sob, cp, dizziness palpations. We reviewed options of coming to [facility] so we can evaluated her however she lives in [place]. Given the improvement in her bleeding, she would rather wait to see if there is any improvement in her bleeding. I will call her again in an hour to see how she is doing.  If bleeding subsides will plan to have her come to see Dr. Perea or Monica Brady on Monday.

## 2020-03-08 ENCOUNTER — TELEPHONE (OUTPATIENT)
Dept: UROGYNECOLOGY | Facility: CLINIC | Age: 60
End: 2020-03-08

## 2020-03-08 NOTE — TELEPHONE ENCOUNTER
Spoke with patient 3/7/2020 around 3 PM.  Patient had some increased VB this AM after straining with BM.  Dr. Barnett reviewed things with her when she called earlier.  Patient heeded advice, rested, and bleeding seems to have slowed/stopped.  She did purchase colace today to help with BMs.  Advised patient to continue precautions given to her earlier, go to closest ED if things start to worsen in the meantime. She denies N/V/F/C.  Will have her follow up this week if needed vs RTC in a few weeks as scheduled.

## 2020-03-09 NOTE — TELEPHONE ENCOUNTER
Denies straining with bowel movements since starting stool softeners.  Has had some intermittent bleeding with coughing.  Has decreased significantly.  Did notice one stitch came out this morning. She will call if she has increased bleeding.  Monica Brady, JUVENTINOP-BC

## 2020-03-10 ENCOUNTER — TELEPHONE (OUTPATIENT)
Dept: UROGYNECOLOGY | Facility: CLINIC | Age: 60
End: 2020-03-10

## 2020-03-10 ENCOUNTER — OFFICE VISIT (OUTPATIENT)
Dept: UROGYNECOLOGY | Facility: CLINIC | Age: 60
End: 2020-03-10
Payer: COMMERCIAL

## 2020-03-10 VITALS
DIASTOLIC BLOOD PRESSURE: 70 MMHG | BODY MASS INDEX: 32.91 KG/M2 | SYSTOLIC BLOOD PRESSURE: 116 MMHG | WEIGHT: 209.69 LBS | HEIGHT: 67 IN

## 2020-03-10 DIAGNOSIS — N93.9 VAGINA BLEEDING: ICD-10-CM

## 2020-03-10 DIAGNOSIS — N95.2 VAGINAL ATROPHY: Primary | ICD-10-CM

## 2020-03-10 LAB
FINAL PATHOLOGIC DIAGNOSIS: NORMAL
GROSS: NORMAL

## 2020-03-10 PROCEDURE — 99999 PR PBB SHADOW E&M-EST. PATIENT-LVL III: ICD-10-PCS | Mod: PBBFAC,,, | Performed by: OBSTETRICS & GYNECOLOGY

## 2020-03-10 PROCEDURE — 99024 POSTOP FOLLOW-UP VISIT: CPT | Mod: S$GLB,,, | Performed by: OBSTETRICS & GYNECOLOGY

## 2020-03-10 PROCEDURE — 99999 PR PBB SHADOW E&M-EST. PATIENT-LVL III: CPT | Mod: PBBFAC,,, | Performed by: OBSTETRICS & GYNECOLOGY

## 2020-03-10 PROCEDURE — 99024 PR POST-OP FOLLOW-UP VISIT: ICD-10-PCS | Mod: S$GLB,,, | Performed by: OBSTETRICS & GYNECOLOGY

## 2020-03-10 NOTE — PROGRESS NOTES
[facility] - GYNECOLOGY  [address]  [facility] [address]  March 10, 2020     Brianna Brand  [id number]  1960    UROGYN POST-OP  3/10/2020     Brianna Brand is a 59 y.o.here for a post operative visit.    OPERATIVE NOTE  2020  Title of Operation:  1)  Excision of vaginal mesh  2)  Bilateral urethrolysis  3)  Cystourethroscopy     Indications for Surgery:  1)  Exposure of vaginal mesh:  --s/p GILDA/BSO (menorrhagia) + [facility])              --had MUS for MAXWELL              --MAXWELL improved but did not resolve s/p MUS; pad use decreased from many to 2 PPD with variable wetness (more with cough/sneeze--can feel small gush but less than before MUS); also having some discharge so difficult tell what's on pad    HISTORY SINCE LAST VISIT:  Denies pain. + bleeding after bowel movement on Saturday (POD5).  Has been having intermittent spotting since.  Went to work yesterday-- (POD7).  Saw one suture on , then several yesterday.  Still had a few blood clots this morning.  Bladder issues: +MAXWELL.  Voiding every 1-1 1/2 hours. Denies nocturia.  Bowel issues: Initial bowel movement +straining.  Was not taking stool softener.  Had large amount of bleeding after.  Resolved by next day.  Taking stool softeners.    History reviewed. No pertinent past medical history.    Past Surgical History:   Procedure Laterality Date     SECTION      x2    HYSTERECTOMY      OOPHORECTOMY      vaginal mesh      WISDOM TOOTH EXTRACTION         Family History   Problem Relation Age of Onset    Hypertension Father     Heart attack Father     Breast cancer Neg Hx     Colon cancer Neg Hx     Diabetes Neg Hx     Ovarian cancer Neg Hx        Social History     Socioeconomic History    Marital status:      Spouse name: Not on file    Number of children: Not on file    Years of education: Not on file    Highest education level: Not on file   Occupational History     Not on file   Social Needs    Financial resource strain: Not on file    Food insecurity:     Worry: Not on file     Inability: Not on file    Transportation needs:     Medical: Not on file     Non-medical: Not on file   Tobacco Use    Smoking status: Former Smoker     Last attempt to quit: 2019     Years since quittin.1    Smokeless tobacco: Never Used   Substance and Sexual Activity    Alcohol use: Yes     Frequency: 2-4 times a month     Comment: socially    Drug use: Never    Sexual activity: Yes     Partners: Male     Birth control/protection: See Surgical Hx   Lifestyle    Physical activity:     Days per week: Not on file     Minutes per session: Not on file    Stress: Not on file   Relationships    Social connections:     Talks on phone: Not on file     Gets together: Not on file     Attends [employer] service: Not on file     Active member of club or organization: Not on file     Attends meetings of clubs or organizations: Not on file     Relationship status: Not on file   Other Topics Concern    Not on file   Social History Narrative    Not on file       Current Outpatient Medications   Medication Sig Dispense Refill    estradiol (ESTRACE) 0.01 % (0.1 mg/gram) vaginal cream Place 1 g vaginally 3 (three) times a week. 42.5 g 1    FLUCELVAX QUAD [id number], PF, 60 mcg (15 mcg x 4)/0.5 mL Syrg       HYDROcodone-acetaminophen (NORCO) 5-325 mg per tablet Take 1 tablet by mouth every 6 (six) hours as needed. 15 tablet 0    ibuprofen (ADVIL,MOTRIN) 600 MG tablet Take 1 tablet (600 mg total) by mouth 3 (three) times daily. 30 tablet 1     No current facility-administered medications for this visit.        Review of patient's allergies indicates:  No Known Allergies     ROS  Per HPI    Well woman:  Pap test: post GILDA.  History of abnormal paps: No.  History of STIs:  Yes - reports had 1 in college--treated  Mammogram: Date of last: 1/10/19 ([facility]).  Result: Normal  Colonoscopy: none.  Doesn't  "want one.    DEXA: none    Physical Exam  /70 (BP Location: Right arm, Patient Position: Sitting, BP Method: Large (Manual))   Ht 5' 7" (1.702 m)   Wt 95.1 kg (209 lb 10.5 oz)   BMI 32.84 kg/m²   General: alert and oriented, no acute distress  Respiratory: normal respiratory effort  Abd: soft, non-tender, non-distended  Pelvic:  Ext. Genitalia: normal external genitalia. Normal bartholin's and skenes glands  Vagina: + atrophy. Normal vaginal mucosa without lesions. No discharge noted.   Non-tender bladder base without palpable mass. Suburethral incision intact. No obvious bleeding until superficial edge irritated by Qtip.  AgNO3 applied to area with good hemostasis noted.   Cervix: absent  Uterus:  absent   Urethra: no masses or tenderness  Urethral meatus: no lesions, caruncle or prolapse.    Impression  1. Vaginal atrophy     2. Vagina bleeding       We reviewed the above issues and discussed options for short-term versus long-term management of her problems.     Plan:   1. Post op healing well. Continue to follow post op instructions.  2. Superficial skin irritation/bleeding: AgNO3 applied with good result.  Patient advised to pat dry after urinating, avoid hard wiping.  CTM--given bleeding precautions.   3. Patient will follow up with us in 2 weeks.     30 minutes were spent in face to face time with this patient  90 % of this time was spent in counseling and/or coordination of care    ME@  [facility]  Division of Female Pelvic Medicine and Reconstructive Surgery  Department of Obstetrics & Gynecology       "

## 2020-03-10 NOTE — TELEPHONE ENCOUNTER
"----- Message from Raad Lewis MA sent at 3/10/2020  9:02 AM CDT -----  Spoke to pt, s/p explant, implanted device on 3/2/2020, pt states yesterday evening after speaking with Monica  her bleeding became heavier, she states between the hours of 3p- 10p she changed her pad x3, she reports passing "elongated" blood clots that measures about an inch long, she also noticed her stitches came out.  She states by bed time the bleeding became light in flow.   She states she woke up this a.m, no bleeding. She states she showered and started moving around and her flow was mild to moderate.  Pt denies pain, F/C/N/V, she reports passing gas and having BM's. Please advise.  "

## 2020-03-10 NOTE — TELEPHONE ENCOUNTER
Spoke with patient.  VB yesterday AM 3/9 was very light.  Started to have increased VB after lunch yesterday PM, not as heavy as Sat but had to change pads 3x yesterday.  Had stitches come out last PM, as well, with small blood clot.  Advised to come to clinic today to be evaluated.

## 2020-03-10 NOTE — TELEPHONE ENCOUNTER
----- Message from Josselyn Browne sent at 3/10/2020  7:51 AM CDT -----  Contact: GLENDY MCCLOUD   Name of Who is Calling: GLENDY MCCLOUD       What is the request in detail: Patient states she is bleeding again and is requesting to be seen today. Please contact to further advise.      Can the clinic reply by [facility]: NO      What Number to Call Back if not in [facility]:

## 2020-03-10 NOTE — TELEPHONE ENCOUNTER
"Spoke to pt, s/p explant, implanted device on 3/2/2020, pt states yesterday evening after speaking with Monica  her bleeding became heavier, she states between the hours of 3p- 10p she changed her pad x3, she reports passing "elongated" blood clots that measures about an inch long, she also noticed her stitches came out.  She states by bed time the bleeding became light in flow.   She states she woke up this a.m, no bleeding. She states she showered and started moving around and her flow was mild to moderate.  Pt denies pain, F/C/N/V, she reports passing gas and having BM's. Please advise.  "

## 2020-03-10 NOTE — TELEPHONE ENCOUNTER
----- Message from Chapincito Overton sent at 3/10/2020  8:23 AM CDT -----  Patient was transferred to Urology and stated she is having some complications from a surgery and would like to be advised.

## 2020-03-12 ENCOUNTER — TELEPHONE (OUTPATIENT)
Dept: UROGYNECOLOGY | Facility: CLINIC | Age: 60
End: 2020-03-12

## 2020-03-12 NOTE — TELEPHONE ENCOUNTER
----- Message from Brian Perea MD sent at 3/10/2020  9:34 PM CDT -----  Please let patient know the mesh/stones we removed from her vagina were all benign/ok.  How is her bleeding? Thanks!

## 2020-03-12 NOTE — TELEPHONE ENCOUNTER
Spoke to pt, she was inform the mesh/stones we removed from her vagina were all benign/ok. Pt verbalizes understanding.    Pt states she haven't had any episodes of bleeding since her visit on 3/10/2020.

## 2020-08-03 ENCOUNTER — TELEPHONE (OUTPATIENT)
Dept: OBSTETRICS AND GYNECOLOGY | Facility: CLINIC | Age: 60
End: 2020-08-03

## 2020-08-03 NOTE — TELEPHONE ENCOUNTER
Pt rquesting if Dr. Candelaria did the testosterone pellets. Advised pt that we do not offer the testosterone pellets. Pt verbalized understanding.

## 2020-08-03 NOTE — TELEPHONE ENCOUNTER
----- Message from Marii Edouard sent at 8/3/2020 10:24 AM CDT -----  Contact: pt  Pt requesting a call back to discuss hormone treatment. Please call pt back at [phone]

## 2020-09-08 ENCOUNTER — OFFICE VISIT (OUTPATIENT)
Dept: OBSTETRICS AND GYNECOLOGY | Facility: CLINIC | Age: 60
End: 2020-09-08
Payer: COMMERCIAL

## 2020-09-08 VITALS
SYSTOLIC BLOOD PRESSURE: 122 MMHG | BODY MASS INDEX: 33.32 KG/M2 | WEIGHT: 212.31 LBS | HEIGHT: 67 IN | DIASTOLIC BLOOD PRESSURE: 82 MMHG

## 2020-09-08 DIAGNOSIS — N95.2 VAGINAL ATROPHY: ICD-10-CM

## 2020-09-08 DIAGNOSIS — Z12.39 BREAST CANCER SCREENING: ICD-10-CM

## 2020-09-08 DIAGNOSIS — Z01.419 WELL WOMAN EXAM WITH ROUTINE GYNECOLOGICAL EXAM: Primary | ICD-10-CM

## 2020-09-08 PROCEDURE — 3008F BODY MASS INDEX DOCD: CPT | Mod: CPTII,S$GLB,, | Performed by: OBSTETRICS & GYNECOLOGY

## 2020-09-08 PROCEDURE — 99396 PR PREVENTIVE VISIT,EST,40-64: ICD-10-PCS | Mod: S$GLB,,, | Performed by: OBSTETRICS & GYNECOLOGY

## 2020-09-08 PROCEDURE — 3008F PR BODY MASS INDEX (BMI) DOCUMENTED: ICD-10-PCS | Mod: CPTII,S$GLB,, | Performed by: OBSTETRICS & GYNECOLOGY

## 2020-09-08 PROCEDURE — 99999 PR PBB SHADOW E&M-EST. PATIENT-LVL III: CPT | Mod: PBBFAC,,, | Performed by: OBSTETRICS & GYNECOLOGY

## 2020-09-08 PROCEDURE — 99396 PREV VISIT EST AGE 40-64: CPT | Mod: S$GLB,,, | Performed by: OBSTETRICS & GYNECOLOGY

## 2020-09-08 PROCEDURE — 99999 PR PBB SHADOW E&M-EST. PATIENT-LVL III: ICD-10-PCS | Mod: PBBFAC,,, | Performed by: OBSTETRICS & GYNECOLOGY

## 2020-09-08 RX ORDER — ESTRADIOL 10 UG/1
10 INSERT VAGINAL
Qty: 8 TABLET | Refills: 11 | Status: SHIPPED | OUTPATIENT
Start: 2020-09-10 | End: 2021-08-05

## 2020-09-08 NOTE — PROGRESS NOTES
Subjective:       Patient ID: Brianna Brand is a 60 y.o. female.    Chief Complaint:  Well Woman      History of Present Illness  HPI  Presents for well-woman exam.  Has hx of hysterectomy for benign indications.  Earlier this year, she underwent suburethral mesh excision/BL urethrolysis/cystoscopy with urogyn for exposed suburethral mesh.  Doing great since then.  Has vaginal dryness during intercourse.  Also c/o low libido.  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  She works 10 hours per day, and is often tired by the end of the week.  Due for screening MMG    GYN & OB History  No LMP recorded. Patient has had a hysterectomy.   Date of Last Pap: No result found    OB History    Para Term  AB Living   2 2 2     2   SAB TAB Ectopic Multiple Live Births           2      # Outcome Date GA Lbr Jim/2nd Weight Sex Delivery Anes PTL Lv   2 Term      CS-LTranv   KATIE   1 Term      CS-LTranv   KATIE       Review of Systems  Review of Systems   Constitutional: Negative for fatigue, fever and unexpected weight change.   Gastrointestinal: Negative for abdominal pain, bloating, blood in stool, constipation, diarrhea, nausea and vomiting.   Endocrine: Negative for hot flashes.   Genitourinary: Positive for decreased libido and vaginal dryness. Negative for dyspareunia, dysuria, flank pain, frequency, genital sores, hematuria, pelvic pain, urgency, vaginal bleeding, vaginal discharge, vaginal pain, urinary incontinence, postcoital bleeding, postmenopausal bleeding and vaginal odor.   Integumentary:  Negative for rash, hair changes, breast mass, nipple discharge and breast skin changes.   Psychiatric/Behavioral: Negative for depression. The patient is not nervous/anxious.    Breast: Negative for mass, mastodynia, nipple discharge and skin changes          Objective:    Physical Exam:   Constitutional: She is oriented to person, place, and time. She appears well-developed and well-nourished. No distress.      Neck:  Neck supple. No thyromegaly present.     Pulmonary/Chest: Right breast exhibits no inverted nipple, no mass, no nipple discharge, no skin change, no tenderness, no bleeding and no swelling. Left breast exhibits no inverted nipple, no mass, no nipple discharge, no skin change, no tenderness, no bleeding and no swelling. Breasts are symmetrical.        Abdominal: Soft. She exhibits no distension and no mass. There is no abdominal tenderness. There is no rebound and no guarding. Hernia confirmed negative in the right inguinal area and confirmed negative in the left inguinal area.     Genitourinary:    Vagina normal.      Pelvic exam was performed with patient supine.   There is no rash, tenderness, lesion or injury on the right labia. There is no rash, tenderness, lesion or injury on the left labia. Uterus is absent. Right adnexum displays no mass, no tenderness and no fullness. Left adnexum displays no mass, no tenderness and no fullness. No erythema (mucosa pale and thin), tenderness, bleeding, rectocele, cystocele or unspecified prolapse of vaginal walls in the vagina.    No foreign body in the vagina.      No signs of injury (vaginal mucosal incision has healed well and is intact) in the vagina.   Cervix exhibits absence. negative for vaginal discharge              Neurological: She is alert and oriented to person, place, and time.     Psychiatric: She has a normal mood and affect.          Assessment:        1. Well woman exam with routine gynecological exam    2. Vaginal atrophy    3. Breast cancer screening                Plan:      Brianna was seen today for well woman.    Diagnoses and all orders for this visit:    Well woman exam with routine gynecological exam    Vaginal atrophy  -     estradioL (VAGIFEM) 10 mcg Tab; Place 1 tablet (10 mcg total) vaginally twice a week.    Breast cancer screening  -     Mammo Digital Screening Bilat w/ Gerry; Future    Reviewed many different causes and contributors to low  libido.  Explained that her long work days can certainly contribute to fatigue and disinterest in sex at the end of the week.  Reviewed importance of open communication about this with her partner.  Will treat vaginal atrophy with vagifem.  EVOO for lubrication during intercourse.  Reviewed updated recommendations for pap smears (no pap smear needed) in patients with a hysterectomy for benign indications.   Patient needs a pelvic exam every year.  Reviewed recommendations for annual CBE and annual MMG.  RTC 1 year.

## 2020-09-09 ENCOUNTER — HOSPITAL ENCOUNTER (OUTPATIENT)
Dept: RADIOLOGY | Facility: HOSPITAL | Age: 60
Discharge: HOME | End: 2020-09-09
Attending: OBSTETRICS & GYNECOLOGY
Payer: COMMERCIAL

## 2020-09-09 VITALS — BODY MASS INDEX: 33.32 KG/M2 | WEIGHT: 212.31 LBS | HEIGHT: 67 IN

## 2020-09-09 DIAGNOSIS — Z12.39 BREAST CANCER SCREENING: ICD-10-CM

## 2020-09-09 PROCEDURE — 77067 MAMMO DIGITAL SCREENING BILAT WITH TOMOSYNTHESIS_CAD: ICD-10-PCS | Mod: 26,,, | Performed by: RADIOLOGY

## 2020-09-09 PROCEDURE — 77067 SCR MAMMO BI INCL CAD: CPT | Mod: TC

## 2020-09-09 PROCEDURE — 77063 MAMMO DIGITAL SCREENING BILAT WITH TOMOSYNTHESIS_CAD: ICD-10-PCS | Mod: 26,,, | Performed by: RADIOLOGY

## 2020-09-09 PROCEDURE — 77067 SCR MAMMO BI INCL CAD: CPT | Mod: 26,,, | Performed by: RADIOLOGY

## 2020-09-09 PROCEDURE — 77063 BREAST TOMOSYNTHESIS BI: CPT | Mod: 26,,, | Performed by: RADIOLOGY

## 2021-04-29 ENCOUNTER — PATIENT MESSAGE (OUTPATIENT)
Dept: RESEARCH | Facility: HOSPITAL | Age: 61
End: 2021-04-29

## (undated) DEVICE — TRAY DRY SKIN SCRUB PREP

## (undated) DEVICE — SCRUB 10% POVIDONE IODINE 4OZ

## (undated) DEVICE — PAD PREP 50/CA

## (undated) DEVICE — SYR 10CC LUER LOCK

## (undated) DEVICE — SOL 9P NACL IRR PIC IL

## (undated) DEVICE — SUT PDS II 0 CT VIL MONO 36

## (undated) DEVICE — GLOVE BIOGEL SKINSENSE PI 7.0

## (undated) DEVICE — KIT URINE METER 350ML STAT LOC

## (undated) DEVICE — BANDAGE GAUZE 6PLY FLUFF 2X3

## (undated) DEVICE — NDL HYPO REG 25G X 1 1/2

## (undated) DEVICE — SUT VICRYL CTD 2-0 GI 27 SH

## (undated) DEVICE — SUT 2/0 27IN PDS II VIO MO

## (undated) DEVICE — BLADE SURG STAINLESS STEEL #11

## (undated) DEVICE — SEE MEDLINE ITEM 156930

## (undated) DEVICE — SOL PVP-I SCRUB 7.5% 4OZ